# Patient Record
Sex: FEMALE | Race: WHITE | NOT HISPANIC OR LATINO | Employment: FULL TIME | URBAN - METROPOLITAN AREA
[De-identification: names, ages, dates, MRNs, and addresses within clinical notes are randomized per-mention and may not be internally consistent; named-entity substitution may affect disease eponyms.]

---

## 2017-02-20 RX ORDER — LOSARTAN POTASSIUM 100 MG/1
TABLET ORAL
Qty: 30 TABLET | Refills: 0 | Status: SHIPPED | OUTPATIENT
Start: 2017-02-20 | End: 2017-03-17

## 2017-02-20 NOTE — TELEPHONE ENCOUNTER
----- Message from Rita Vivar sent at 2/20/2017  1:09 PM EST -----  Patient needs a refill of her losartin 100mg called in to [facility] in [place], please. She has 2 pills left.

## 2017-03-17 ENCOUNTER — OFFICE VISIT (OUTPATIENT)
Dept: INTERNAL MEDICINE | Facility: CLINIC | Age: 57
End: 2017-03-17

## 2017-03-17 VITALS
HEIGHT: 65 IN | WEIGHT: 163.8 LBS | DIASTOLIC BLOOD PRESSURE: 82 MMHG | TEMPERATURE: 97.3 F | BODY MASS INDEX: 27.29 KG/M2 | SYSTOLIC BLOOD PRESSURE: 132 MMHG

## 2017-03-17 DIAGNOSIS — E53.8 B12 DEFICIENCY: ICD-10-CM

## 2017-03-17 DIAGNOSIS — E55.9 VITAMIN D DEFICIENCY: ICD-10-CM

## 2017-03-17 DIAGNOSIS — Z11.59 NEED FOR HEPATITIS C SCREENING TEST: ICD-10-CM

## 2017-03-17 DIAGNOSIS — I10 BENIGN ESSENTIAL HYPERTENSION: Primary | ICD-10-CM

## 2017-03-17 DIAGNOSIS — J30.1 ALLERGIC RHINITIS DUE TO POLLEN, UNSPECIFIED RHINITIS SEASONALITY: ICD-10-CM

## 2017-03-17 DIAGNOSIS — K21.9 GASTROESOPHAGEAL REFLUX DISEASE, ESOPHAGITIS PRESENCE NOT SPECIFIED: ICD-10-CM

## 2017-03-17 LAB
25(OH)D3 SERPL-MCNC: 24.8 NG/ML
ALBUMIN SERPL-MCNC: 5 G/DL (ref 3.2–4.8)
ALBUMIN/GLOB SERPL: 1.9 G/DL (ref 1.5–2.5)
ALP SERPL-CCNC: 94 U/L (ref 25–100)
ALT SERPL W P-5'-P-CCNC: 17 U/L (ref 7–40)
ANION GAP SERPL CALCULATED.3IONS-SCNC: 3 MMOL/L (ref 3–11)
ARTICHOKE IGE QN: 106 MG/DL (ref 0–130)
AST SERPL-CCNC: 26 U/L (ref 0–33)
BILIRUB SERPL-MCNC: 0.3 MG/DL (ref 0.3–1.2)
BUN BLD-MCNC: 12 MG/DL (ref 9–23)
BUN/CREAT SERPL: 13.3 (ref 7–25)
CALCIUM SPEC-SCNC: 10.7 MG/DL (ref 8.7–10.4)
CHLORIDE SERPL-SCNC: 102 MMOL/L (ref 99–109)
CHOLEST SERPL-MCNC: 238 MG/DL (ref 0–200)
CO2 SERPL-SCNC: 32 MMOL/L (ref 20–31)
CREAT BLD-MCNC: 0.9 MG/DL (ref 0.6–1.3)
GFR SERPL CREATININE-BSD FRML MDRD: 65 ML/MIN/1.73
GLOBULIN UR ELPH-MCNC: 2.7 GM/DL
GLUCOSE BLD-MCNC: 99 MG/DL (ref 70–100)
HCV AB SER DONR QL: NORMAL
HDLC SERPL-MCNC: 111 MG/DL (ref 40–60)
POTASSIUM BLD-SCNC: 5.1 MMOL/L (ref 3.5–5.5)
PROT SERPL-MCNC: 7.7 G/DL (ref 5.7–8.2)
SODIUM BLD-SCNC: 137 MMOL/L (ref 132–146)
TRIGL SERPL-MCNC: 44 MG/DL (ref 0–150)
TSH SERPL DL<=0.05 MIU/L-ACNC: 3.58 MIU/ML (ref 0.35–5.35)
VIT B12 BLD-MCNC: 634 PG/ML (ref 211–911)

## 2017-03-17 PROCEDURE — 99214 OFFICE O/P EST MOD 30 MIN: CPT | Performed by: INTERNAL MEDICINE

## 2017-03-17 PROCEDURE — 80053 COMPREHEN METABOLIC PANEL: CPT | Performed by: INTERNAL MEDICINE

## 2017-03-17 PROCEDURE — 82306 VITAMIN D 25 HYDROXY: CPT | Performed by: INTERNAL MEDICINE

## 2017-03-17 PROCEDURE — 86803 HEPATITIS C AB TEST: CPT | Performed by: INTERNAL MEDICINE

## 2017-03-17 PROCEDURE — 80061 LIPID PANEL: CPT | Performed by: INTERNAL MEDICINE

## 2017-03-17 PROCEDURE — 82607 VITAMIN B-12: CPT | Performed by: INTERNAL MEDICINE

## 2017-03-17 PROCEDURE — 84443 ASSAY THYROID STIM HORMONE: CPT | Performed by: INTERNAL MEDICINE

## 2017-03-17 RX ORDER — LOSARTAN POTASSIUM AND HYDROCHLOROTHIAZIDE 25; 100 MG/1; MG/1
1 TABLET ORAL DAILY
Qty: 90 TABLET | Refills: 1 | Status: SHIPPED | OUTPATIENT
Start: 2017-03-17 | End: 2017-04-19

## 2017-03-17 RX ORDER — OMEPRAZOLE 20 MG/1
20 CAPSULE, DELAYED RELEASE ORAL DAILY
Qty: 90 CAPSULE | Refills: 3 | Status: SHIPPED | OUTPATIENT
Start: 2017-03-17 | End: 2019-01-14

## 2017-03-17 RX ORDER — ESCITALOPRAM OXALATE 10 MG/1
10 TABLET ORAL DAILY
Qty: 90 TABLET | Refills: 3 | Status: CANCELLED | OUTPATIENT
Start: 2017-03-17

## 2017-03-17 RX ORDER — HYDROCHLOROTHIAZIDE 25 MG/1
25 TABLET ORAL DAILY
Qty: 90 TABLET | Refills: 1 | Status: CANCELLED | OUTPATIENT
Start: 2017-03-17

## 2017-03-17 RX ORDER — DESLORATADINE 5 MG/1
5 TABLET ORAL DAILY
Qty: 30 TABLET | Refills: 5 | Status: SHIPPED | OUTPATIENT
Start: 2017-03-17 | End: 2017-11-29

## 2017-03-17 RX ORDER — ESCITALOPRAM OXALATE 20 MG/1
20 TABLET ORAL DAILY
Qty: 90 TABLET | Refills: 3 | Status: SHIPPED | OUTPATIENT
Start: 2017-03-17 | End: 2018-03-19 | Stop reason: SDUPTHER

## 2017-03-17 RX ORDER — LOSARTAN POTASSIUM 100 MG/1
100 TABLET ORAL DAILY
Qty: 30 TABLET | Refills: 0 | Status: CANCELLED | OUTPATIENT
Start: 2017-03-17

## 2017-03-17 NOTE — PROGRESS NOTES
"Subjective   Po Edmondson is a 56 y.o. female.     History of Present Illness     Here for f/u on:   htn - no problems, runs usually even a little lower than today's reading. Would like combo of the hctz and losartan.    Allergies. We had used xyzal in '15 but she felt it was too drying. Likes the stahist and works well, but is expensive, about $30 per rx. Wants to see allergist at some point, but not yet. Also using flonase prn.     Menopausal symptoms - she has had more hot flashes recently, and more irritability. She had initially started the lexapro for anxiety, and has worked. Wondering about going up on dose    b12 def - she is getting shots every 4-6 weeks    D def - she is taking 2000/d, most days    She is wondering if she should start asa. She has had several family members w/ stroke and heart attack.    The following portions of the patient's history were reviewed and updated as appropriate: allergies, current medications, past family history, past medical history, past social history, past surgical history and problem list.    Review of Systems   Constitutional: Positive for fatigue. Negative for activity change, appetite change, fever and unexpected weight change.   Endocrine: Positive for heat intolerance (hot flahses).   Genitourinary: Positive for menstrual problem (menopausal). Negative for vaginal bleeding.   Skin: Negative.  Rash: last year had breakouts w/ her allergies.   Psychiatric/Behavioral: Nervous/anxious: a little more irritability.        Objective   Blood pressure 132/82, temperature 97.3 °F (36.3 °C), temperature source Temporal Artery , height 64.6\" (164.1 cm), weight 163 lb 12.8 oz (74.3 kg).  Physical Exam   Constitutional: She is oriented to person, place, and time. She appears well-developed and well-nourished. No distress.   HENT:   Head: Normocephalic and atraumatic.   Eyes: Conjunctivae and EOM are normal.   Cardiovascular: Normal rate, regular rhythm and normal heart sounds.  " Exam reveals no gallop and no friction rub.    No murmur heard.  Pulmonary/Chest: Effort normal and breath sounds normal. No respiratory distress. She has no wheezes.   Neurological: She is alert and oriented to person, place, and time.   Skin: Skin is warm and dry. She is not diaphoretic.   Psychiatric: She has a normal mood and affect. Her behavior is normal. Judgment and thought content normal.       Assessment/Plan   Po was seen today for med refill.    Diagnoses and all orders for this visit:    Benign essential hypertension  -     Comprehensive Metabolic Panel  -     Lipid Panel  -     TSH  -     losartan-hydrochlorothiazide (HYZAAR) 100-25 MG per tablet; Take 1 tablet by mouth Daily.    Allergic rhinitis due to pollen, unspecified rhinitis seasonality  -     desloratadine (CLARINEX) 5 MG tablet; Take 1 tablet by mouth Daily.    Gastroesophageal reflux disease, esophagitis presence not specified    Need for hepatitis C screening test  -     Hepatitis C Antibody    B12 deficiency  -     Vitamin B12    Vitamin D deficiency  -     Vitamin D 25 Hydroxy    Other orders  -     omeprazole (priLOSEC) 20 MG capsule; Take 1 capsule by mouth Daily.  -     escitalopram (LEXAPRO) 20 MG tablet; Take 1 tablet by mouth Daily.        She had her abner in 3/17. Colon is due in '20. We will try higher dose of lexapro for irritability and menopaual symptoms. Will see how clarinex does compared w/ the stahist

## 2017-04-18 ENCOUNTER — TELEPHONE (OUTPATIENT)
Dept: INTERNAL MEDICINE | Facility: CLINIC | Age: 57
End: 2017-04-18

## 2017-04-18 NOTE — TELEPHONE ENCOUNTER
She is now taking the combined BP with the hctz.  She has noticed she is having more swelling in her hands and feet.  She said you can see the tightness.  Wanted to let you know.

## 2017-04-19 RX ORDER — HYDROCHLOROTHIAZIDE 25 MG/1
25 TABLET ORAL DAILY
Qty: 90 TABLET | Refills: 1 | Status: SHIPPED | OUTPATIENT
Start: 2017-04-19 | End: 2017-10-30 | Stop reason: SDUPTHER

## 2017-04-19 RX ORDER — LOSARTAN POTASSIUM 100 MG/1
100 TABLET ORAL DAILY
Qty: 90 TABLET | Refills: 1 | Status: SHIPPED | OUTPATIENT
Start: 2017-04-19 | End: 2017-09-11 | Stop reason: SDUPTHER

## 2017-09-11 RX ORDER — LOSARTAN POTASSIUM 100 MG/1
TABLET ORAL
Qty: 30 TABLET | Refills: 0 | Status: SHIPPED | OUTPATIENT
Start: 2017-09-11 | End: 2017-11-29 | Stop reason: SDUPTHER

## 2017-09-22 RX ORDER — CYANOCOBALAMIN 1000 UG/ML
INJECTION, SOLUTION INTRAMUSCULAR; SUBCUTANEOUS
Qty: 1 ML | Refills: 5 | Status: SHIPPED | OUTPATIENT
Start: 2017-09-22 | End: 2018-07-18 | Stop reason: SDUPTHER

## 2017-10-30 ENCOUNTER — TELEPHONE (OUTPATIENT)
Dept: INTERNAL MEDICINE | Facility: CLINIC | Age: 57
End: 2017-10-30

## 2017-10-30 RX ORDER — FLUTICASONE PROPIONATE 50 MCG
2 SPRAY, SUSPENSION (ML) NASAL DAILY
Qty: 1 BOTTLE | Refills: 0 | Status: SHIPPED | OUTPATIENT
Start: 2017-10-30 | End: 2018-07-18

## 2017-10-30 RX ORDER — HYDROCHLOROTHIAZIDE 25 MG/1
25 TABLET ORAL DAILY
Qty: 30 TABLET | Refills: 0 | Status: SHIPPED | OUTPATIENT
Start: 2017-10-30 | End: 2017-11-29 | Stop reason: SDUPTHER

## 2017-11-29 ENCOUNTER — OFFICE VISIT (OUTPATIENT)
Dept: INTERNAL MEDICINE | Facility: CLINIC | Age: 57
End: 2017-11-29

## 2017-11-29 VITALS
TEMPERATURE: 97.3 F | WEIGHT: 159.6 LBS | BODY MASS INDEX: 26.59 KG/M2 | HEIGHT: 65 IN | DIASTOLIC BLOOD PRESSURE: 80 MMHG | SYSTOLIC BLOOD PRESSURE: 130 MMHG

## 2017-11-29 DIAGNOSIS — J30.2 CHRONIC SEASONAL ALLERGIC RHINITIS, UNSPECIFIED TRIGGER: ICD-10-CM

## 2017-11-29 DIAGNOSIS — I10 ESSENTIAL HYPERTENSION: Primary | ICD-10-CM

## 2017-11-29 LAB
ALBUMIN SERPL-MCNC: 4.7 G/DL (ref 3.2–4.8)
ALBUMIN/GLOB SERPL: 2 G/DL (ref 1.5–2.5)
ALP SERPL-CCNC: 80 U/L (ref 25–100)
ALT SERPL W P-5'-P-CCNC: 18 U/L (ref 7–40)
ANION GAP SERPL CALCULATED.3IONS-SCNC: 9 MMOL/L (ref 3–11)
AST SERPL-CCNC: 20 U/L (ref 0–33)
BASOPHILS # BLD AUTO: 0.06 10*3/MM3 (ref 0–0.2)
BASOPHILS NFR BLD AUTO: 0.7 % (ref 0–1)
BILIRUB SERPL-MCNC: 0.8 MG/DL (ref 0.3–1.2)
BUN BLD-MCNC: 9 MG/DL (ref 9–23)
BUN/CREAT SERPL: 10 (ref 7–25)
CALCIUM SPEC-SCNC: 9.7 MG/DL (ref 8.7–10.4)
CHLORIDE SERPL-SCNC: 90 MMOL/L (ref 99–109)
CO2 SERPL-SCNC: 29 MMOL/L (ref 20–31)
CREAT BLD-MCNC: 0.9 MG/DL (ref 0.6–1.3)
DEPRECATED RDW RBC AUTO: 39.9 FL (ref 37–54)
EOSINOPHIL # BLD AUTO: 0.06 10*3/MM3 (ref 0–0.3)
EOSINOPHIL NFR BLD AUTO: 0.7 % (ref 0–3)
ERYTHROCYTE [DISTWIDTH] IN BLOOD BY AUTOMATED COUNT: 12.4 % (ref 11.3–14.5)
GFR SERPL CREATININE-BSD FRML MDRD: 65 ML/MIN/1.73
GLOBULIN UR ELPH-MCNC: 2.3 GM/DL
GLUCOSE BLD-MCNC: 99 MG/DL (ref 70–100)
HCT VFR BLD AUTO: 36.8 % (ref 34.5–44)
HGB BLD-MCNC: 11.6 G/DL (ref 11.5–15.5)
IMM GRANULOCYTES # BLD: 0.02 10*3/MM3 (ref 0–0.03)
IMM GRANULOCYTES NFR BLD: 0.2 % (ref 0–0.6)
LYMPHOCYTES # BLD AUTO: 1.48 10*3/MM3 (ref 0.6–4.8)
LYMPHOCYTES NFR BLD AUTO: 17.5 % (ref 24–44)
MCH RBC QN AUTO: 28 PG (ref 27–31)
MCHC RBC AUTO-ENTMCNC: 31.5 G/DL (ref 32–36)
MCV RBC AUTO: 88.9 FL (ref 80–99)
MONOCYTES # BLD AUTO: 0.69 10*3/MM3 (ref 0–1)
MONOCYTES NFR BLD AUTO: 8.2 % (ref 0–12)
NEUTROPHILS # BLD AUTO: 6.14 10*3/MM3 (ref 1.5–8.3)
NEUTROPHILS NFR BLD AUTO: 72.7 % (ref 41–71)
PLATELET # BLD AUTO: 339 10*3/MM3 (ref 150–450)
PMV BLD AUTO: 10.7 FL (ref 6–12)
POTASSIUM BLD-SCNC: 5 MMOL/L (ref 3.5–5.5)
PROT SERPL-MCNC: 7 G/DL (ref 5.7–8.2)
RBC # BLD AUTO: 4.14 10*6/MM3 (ref 3.89–5.14)
SODIUM BLD-SCNC: 128 MMOL/L (ref 132–146)
TSH SERPL DL<=0.05 MIU/L-ACNC: 2.15 MIU/ML (ref 0.35–5.35)
WBC NRBC COR # BLD: 8.45 10*3/MM3 (ref 3.5–10.8)

## 2017-11-29 PROCEDURE — 99214 OFFICE O/P EST MOD 30 MIN: CPT | Performed by: INTERNAL MEDICINE

## 2017-11-29 PROCEDURE — 84443 ASSAY THYROID STIM HORMONE: CPT | Performed by: INTERNAL MEDICINE

## 2017-11-29 PROCEDURE — 80053 COMPREHEN METABOLIC PANEL: CPT | Performed by: INTERNAL MEDICINE

## 2017-11-29 PROCEDURE — 85025 COMPLETE CBC W/AUTO DIFF WBC: CPT | Performed by: INTERNAL MEDICINE

## 2017-11-29 RX ORDER — BUPROPION HYDROCHLORIDE 150 MG/1
150 TABLET ORAL DAILY
Qty: 90 TABLET | Refills: 3 | Status: SHIPPED | OUTPATIENT
Start: 2017-11-29 | End: 2018-07-18

## 2017-11-29 RX ORDER — LOSARTAN POTASSIUM 100 MG/1
100 TABLET ORAL DAILY
Qty: 90 TABLET | Refills: 1 | Status: SHIPPED | OUTPATIENT
Start: 2017-11-29 | End: 2018-03-08

## 2017-11-29 RX ORDER — BUPROPION HYDROCHLORIDE 150 MG/1
150 TABLET ORAL DAILY
COMMUNITY
End: 2017-11-29 | Stop reason: SDUPTHER

## 2017-11-29 RX ORDER — HYDROCHLOROTHIAZIDE 25 MG/1
25 TABLET ORAL DAILY
Qty: 90 TABLET | Refills: 1 | Status: SHIPPED | OUTPATIENT
Start: 2017-11-29 | End: 2018-06-27 | Stop reason: SDUPTHER

## 2017-11-29 RX ORDER — FEXOFENADINE HCL AND PSEUDOEPHEDRINE HCI 180; 240 MG/1; MG/1
1 TABLET, EXTENDED RELEASE ORAL DAILY
Qty: 90 TABLET | Refills: 3 | Status: SHIPPED | OUTPATIENT
Start: 2017-11-29 | End: 2018-07-18

## 2017-11-29 NOTE — PROGRESS NOTES
Subjective   Po Edmondson is a 57 y.o. female.     History of Present Illness   Here for f/u on:     htn - no problems, checks regularly and  runs in the 120s/60s range     Allergies. We had used xyzal in '15 but she felt it was too drying. Likes the stahist and works well, but is expensive, about $30 per rx. We tried clarinex last visit, but this is not helping, so stopped. Using flonase but doesn't help much. She has a lot of drainage/congestion, postnasal drip, sore throat. Has been worse this fall.   Wants to see allergist at some point, but not yet.  She had been tested years ago, but had been on allergy meds at the time so not sure if they were accurate     Menopausal symptoms - she is on Evamist and progestogen now w/ Dr Cruz and that has helped w/ her hot flashes. She is taking the lexapro 10 (1/2 of a 20), and wellbutrin 150 and doing ok w/ these as well    b12 def - she is getting shots every 4-6 weeks     D def - she is taking 2000/d, but not regualrly       The following portions of the patient's history were reviewed and updated as appropriate: allergies, current medications, past family history, past medical history, past social history, past surgical history and problem list.    Review of Systems   Constitutional: Positive for unexpected weight change (trying to lose). Negative for activity change and appetite change.   HENT: Positive for congestion, ear pain, postnasal drip, rhinorrhea, sinus pressure and sore throat.    Cardiovascular: Positive for leg swelling (uses compression stockings which help).   Gastrointestinal: Positive for constipation (she uses sennekot prn and helps).   Genitourinary:        On HRT now, working well   Musculoskeletal: Negative for arthralgias.   Allergic/Immunologic: Positive for environmental allergies. Negative for food allergies and immunocompromised state.   Psychiatric/Behavioral: Negative for dysphoric mood.       Objective   Blood pressure 130/80, temperature  "97.3 °F (36.3 °C), temperature source Temporal Artery , height 64.6\" (164.1 cm), weight 159 lb 9.6 oz (72.4 kg).  Physical Exam   Constitutional: She is oriented to person, place, and time. She appears well-developed and well-nourished. No distress.   HENT:   Head: Normocephalic and atraumatic.   Right Ear: External ear normal.   Left Ear: External ear normal.   Mouth/Throat: No oropharyngeal exudate.   Eyes: Conjunctivae and EOM are normal.   Cardiovascular: Normal rate, regular rhythm and normal heart sounds.  Exam reveals no gallop and no friction rub.    No murmur heard.  Pulmonary/Chest: Effort normal and breath sounds normal. No respiratory distress. She has no wheezes.   Neurological: She is alert and oriented to person, place, and time.   Skin: Skin is warm and dry. She is not diaphoretic.   Psychiatric: She has a normal mood and affect. Her behavior is normal. Judgment and thought content normal.       Assessment/Plan   Po was seen today for med refill and allergies.    Diagnoses and all orders for this visit:    Essential hypertension - good control  -     Comprehensive Metabolic Panel  -     CBC & Differential  -     TSH  -     CBC Auto Differential  -     losartan (COZAAR) 100 MG tablet; Take 1 tablet by mouth Daily.  -     hydrochlorothiazide (HYDRODIURIL) 25 MG tablet; Take 1 tablet by mouth Daily.    Chronic seasonal allergic rhinitis, unspecified trigger - will try allegra D and d/c clarinex. We disucssed singulair, and we may add this if allegraD is not enough. She will monitor BP on the allegraD  -     fexofenadine-pseudoephedrine (ALLEGRA-D ALLERGY & CONGESTION) 180-240 MG per 24 hr tablet; Take 1 tablet by mouth Daily.    Other orders  -     buPROPion XL (WELLBUTRIN XL) 150 MG 24 hr tablet; Take 1 tablet by mouth Daily.      She will try to take the D more regualrly         "

## 2017-12-02 DIAGNOSIS — E87.1 HYPONATREMIA: Primary | ICD-10-CM

## 2018-03-08 RX ORDER — LOSARTAN POTASSIUM 100 MG/1
TABLET ORAL
Qty: 90 TABLET | Refills: 0 | Status: SHIPPED | OUTPATIENT
Start: 2018-03-08 | End: 2018-06-27 | Stop reason: SDUPTHER

## 2018-03-19 RX ORDER — ESCITALOPRAM OXALATE 20 MG/1
TABLET ORAL
Qty: 90 TABLET | Refills: 2 | Status: SHIPPED | OUTPATIENT
Start: 2018-03-19 | End: 2019-03-19

## 2018-06-27 DIAGNOSIS — I10 ESSENTIAL HYPERTENSION: ICD-10-CM

## 2018-06-27 RX ORDER — HYDROCHLOROTHIAZIDE 25 MG/1
25 TABLET ORAL DAILY
Qty: 30 TABLET | Refills: 0 | Status: SHIPPED | OUTPATIENT
Start: 2018-06-27 | End: 2018-07-18 | Stop reason: SDUPTHER

## 2018-06-27 RX ORDER — LOSARTAN POTASSIUM 100 MG/1
100 TABLET ORAL DAILY
Qty: 30 TABLET | Refills: 0 | Status: SHIPPED | OUTPATIENT
Start: 2018-06-27 | End: 2018-07-18 | Stop reason: SDUPTHER

## 2018-07-17 PROBLEM — E55.9 VITAMIN D DEFICIENCY: Status: ACTIVE | Noted: 2018-07-17

## 2018-07-18 ENCOUNTER — OFFICE VISIT (OUTPATIENT)
Dept: INTERNAL MEDICINE | Facility: CLINIC | Age: 58
End: 2018-07-18

## 2018-07-18 VITALS
DIASTOLIC BLOOD PRESSURE: 82 MMHG | TEMPERATURE: 98.1 F | WEIGHT: 159.2 LBS | BODY MASS INDEX: 26.52 KG/M2 | HEART RATE: 75 BPM | SYSTOLIC BLOOD PRESSURE: 128 MMHG | OXYGEN SATURATION: 99 % | HEIGHT: 65 IN

## 2018-07-18 DIAGNOSIS — I10 ESSENTIAL HYPERTENSION: ICD-10-CM

## 2018-07-18 DIAGNOSIS — I10 BENIGN ESSENTIAL HYPERTENSION: ICD-10-CM

## 2018-07-18 DIAGNOSIS — J30.89 CHRONIC NON-SEASONAL ALLERGIC RHINITIS, UNSPECIFIED TRIGGER: ICD-10-CM

## 2018-07-18 DIAGNOSIS — E55.9 VITAMIN D DEFICIENCY: ICD-10-CM

## 2018-07-18 DIAGNOSIS — D51.0 PERNICIOUS ANEMIA: Primary | ICD-10-CM

## 2018-07-18 LAB
25(OH)D3 SERPL-MCNC: 27 NG/ML
ALBUMIN SERPL-MCNC: 4.47 G/DL (ref 3.2–4.8)
ALBUMIN/GLOB SERPL: 2.1 G/DL (ref 1.5–2.5)
ALP SERPL-CCNC: 65 U/L (ref 25–100)
ALT SERPL W P-5'-P-CCNC: 17 U/L (ref 7–40)
ANION GAP SERPL CALCULATED.3IONS-SCNC: 10 MMOL/L (ref 3–11)
ARTICHOKE IGE QN: 97 MG/DL (ref 0–130)
AST SERPL-CCNC: 25 U/L (ref 0–33)
BILIRUB SERPL-MCNC: 0.8 MG/DL (ref 0.3–1.2)
BUN BLD-MCNC: 10 MG/DL (ref 9–23)
BUN/CREAT SERPL: 11.8 (ref 7–25)
CALCIUM SPEC-SCNC: 9 MG/DL (ref 8.7–10.4)
CHLORIDE SERPL-SCNC: 95 MMOL/L (ref 99–109)
CHOLEST SERPL-MCNC: 192 MG/DL (ref 0–200)
CO2 SERPL-SCNC: 27 MMOL/L (ref 20–31)
CREAT BLD-MCNC: 0.85 MG/DL (ref 0.6–1.3)
DEPRECATED RDW RBC AUTO: 41.1 FL (ref 37–54)
ERYTHROCYTE [DISTWIDTH] IN BLOOD BY AUTOMATED COUNT: 12.4 % (ref 11.3–14.5)
GFR SERPL CREATININE-BSD FRML MDRD: 69 ML/MIN/1.73
GLOBULIN UR ELPH-MCNC: 2.1 GM/DL
GLUCOSE BLD-MCNC: 88 MG/DL (ref 70–100)
HCT VFR BLD AUTO: 34.8 % (ref 34.5–44)
HDLC SERPL-MCNC: 85 MG/DL (ref 40–60)
HGB BLD-MCNC: 11.1 G/DL (ref 11.5–15.5)
MCH RBC QN AUTO: 28.9 PG (ref 27–31)
MCHC RBC AUTO-ENTMCNC: 31.9 G/DL (ref 32–36)
MCV RBC AUTO: 90.6 FL (ref 80–99)
PLATELET # BLD AUTO: 311 10*3/MM3 (ref 150–450)
PMV BLD AUTO: 10.9 FL (ref 6–12)
POTASSIUM BLD-SCNC: 4.3 MMOL/L (ref 3.5–5.5)
PROT SERPL-MCNC: 6.6 G/DL (ref 5.7–8.2)
RBC # BLD AUTO: 3.84 10*6/MM3 (ref 3.89–5.14)
SODIUM BLD-SCNC: 132 MMOL/L (ref 132–146)
TRIGL SERPL-MCNC: 55 MG/DL (ref 0–150)
VIT B12 BLD-MCNC: 377 PG/ML (ref 211–911)
WBC NRBC COR # BLD: 5.54 10*3/MM3 (ref 3.5–10.8)

## 2018-07-18 PROCEDURE — 99214 OFFICE O/P EST MOD 30 MIN: CPT | Performed by: INTERNAL MEDICINE

## 2018-07-18 PROCEDURE — 80053 COMPREHEN METABOLIC PANEL: CPT | Performed by: INTERNAL MEDICINE

## 2018-07-18 PROCEDURE — 80061 LIPID PANEL: CPT | Performed by: INTERNAL MEDICINE

## 2018-07-18 PROCEDURE — 82607 VITAMIN B-12: CPT | Performed by: INTERNAL MEDICINE

## 2018-07-18 PROCEDURE — 82306 VITAMIN D 25 HYDROXY: CPT | Performed by: INTERNAL MEDICINE

## 2018-07-18 PROCEDURE — 85027 COMPLETE CBC AUTOMATED: CPT | Performed by: INTERNAL MEDICINE

## 2018-07-18 RX ORDER — HYDROCHLOROTHIAZIDE 25 MG/1
25 TABLET ORAL DAILY
Qty: 90 TABLET | Refills: 1 | Status: SHIPPED | OUTPATIENT
Start: 2018-07-18 | End: 2019-02-07 | Stop reason: SDUPTHER

## 2018-07-18 RX ORDER — CYANOCOBALAMIN 1000 UG/ML
1000 INJECTION, SOLUTION INTRAMUSCULAR; SUBCUTANEOUS
Qty: 1 ML | Refills: 5 | Status: SHIPPED | OUTPATIENT
Start: 2018-07-18 | End: 2019-09-24 | Stop reason: SDUPTHER

## 2018-07-18 RX ORDER — LOSARTAN POTASSIUM 100 MG/1
100 TABLET ORAL DAILY
Qty: 90 TABLET | Refills: 1 | Status: SHIPPED | OUTPATIENT
Start: 2018-07-18 | End: 2019-05-20 | Stop reason: SDUPTHER

## 2018-07-18 NOTE — PROGRESS NOTES
"Subjective   Po Edmondson is a 57 y.o. female.     History of Present Illness   Here for f/u on:     htn - no problems, checks regularly and  runs in the 120s/60s range. Taking cozaar and HCTZ regualrly     Allergies. We had used xyzal in '15 but she felt it was too drying. Likes the stahist and works well, but is expensive, about $30 per rx. We tried clarinex and allegra D - but these did not work as well. last visit, but this is not helping, so stopped. Using flonase but doesn't help much. She has a lot of drainage/congestion, postnasal drip, sore throat. Has been worse this fall.   Wants to see allergist at some point, but not yet.  She had been tested years ago, but had been on allergy meds at the time so not sure if they were accurate.    Menopausal symptoms - she is on Evamist and progestogen now w/ Dr Cruz and that has helped w/ her hot flashes. She is taking the lexapro 10 (1/2 of a 20)     b12 def - she is getting shots every 4-6 weeks. Had one about a month ago     D def - she is taking 2000/d, but not regualrly, maybe just     The following portions of the patient's history were reviewed and updated as appropriate: allergies, current medications, past family history, past medical history, past social history, past surgical history and problem list.    Review of Systems   Cardiovascular: Positive for leg swelling (uses compression stockings).   Allergic/Immunologic: Positive for environmental allergies. Negative for immunocompromised state.   Psychiatric/Behavioral: The patient is not nervous/anxious.        Objective    Vitals:    07/18/18 1324   BP: 128/82   BP Location: Right arm   Pulse: 75   Temp: 98.1 °F (36.7 °C)   TempSrc: Temporal Artery    SpO2: 99%   Weight: 72.2 kg (159 lb 3.2 oz)   Height: 164.1 cm (64.6\")     Physical Exam   Constitutional: She is oriented to person, place, and time. She appears well-developed and well-nourished. No distress.   HENT:   Head: Normocephalic and atraumatic. "   Eyes: Pupils are equal, round, and reactive to light. EOM are normal.   Neck: Normal range of motion. Neck supple.   Cardiovascular: Normal rate and regular rhythm.    Pulmonary/Chest: Effort normal and breath sounds normal.   Musculoskeletal: She exhibits no edema.   Neurological: She is alert and oriented to person, place, and time. No cranial nerve deficit.   Skin: Skin is warm and dry. No rash noted. She is not diaphoretic.   Psychiatric: She has a normal mood and affect. Her behavior is normal. Judgment and thought content normal.         Assessment/Plan   Po was seen today for med refill.    Diagnoses and all orders for this visit:    Pernicious anemia  -     Vitamin B12  -     cyanocobalamin 1000 MCG/ML injection; Inject 1 mL into the appropriate muscle as directed by prescriber Every 28 (Twenty-Eight) Days.    Benign essential hypertension - good control  -     CBC (No Diff)  -     Comprehensive Metabolic Panel  -     losartan (COZAAR) 100 MG tablet; Take 1 tablet by mouth Daily.  -     hydrochlorothiazide (HYDRODIURIL) 25 MG tablet; Take 1 tablet by mouth Daily.    Chronic non-seasonal allergic rhinitis, unspecified trigger - stahist has worked the best for her  -     Chlorcyclizine-Pseudoephed (STAHIST AD) 25-60 MG tablet; 1 qd- bid prn allergies      Vitamin D deficiency  -     Vitamin D 25 Hydroxy

## 2018-09-12 RX ORDER — FLUTICASONE PROPIONATE 50 MCG
2 SPRAY, SUSPENSION (ML) NASAL DAILY
Qty: 3 BOTTLE | Refills: 3 | Status: SHIPPED | OUTPATIENT
Start: 2018-09-12 | End: 2020-10-15

## 2019-01-14 ENCOUNTER — OFFICE VISIT (OUTPATIENT)
Dept: INTERNAL MEDICINE | Facility: CLINIC | Age: 59
End: 2019-01-14

## 2019-01-14 VITALS
RESPIRATION RATE: 16 BRPM | WEIGHT: 168.6 LBS | HEART RATE: 85 BPM | TEMPERATURE: 98.1 F | SYSTOLIC BLOOD PRESSURE: 126 MMHG | HEIGHT: 65 IN | OXYGEN SATURATION: 99 % | DIASTOLIC BLOOD PRESSURE: 78 MMHG | BODY MASS INDEX: 28.09 KG/M2

## 2019-01-14 DIAGNOSIS — J01.11 ACUTE RECURRENT FRONTAL SINUSITIS: Primary | ICD-10-CM

## 2019-01-14 DIAGNOSIS — R05.9 COUGH: ICD-10-CM

## 2019-01-14 PROCEDURE — 99213 OFFICE O/P EST LOW 20 MIN: CPT | Performed by: NURSE PRACTITIONER

## 2019-01-14 RX ORDER — BROMPHENIRAMINE MALEATE, PSEUDOEPHEDRINE HYDROCHLORIDE, AND DEXTROMETHORPHAN HYDROBROMIDE 2; 30; 10 MG/5ML; MG/5ML; MG/5ML
SYRUP ORAL
COMMUNITY
Start: 2018-12-13 | End: 2019-01-14

## 2019-01-14 RX ORDER — ESTRADIOL 1 MG/G
GEL TOPICAL
COMMUNITY
End: 2020-02-06 | Stop reason: ALTCHOICE

## 2019-01-14 RX ORDER — DEXTROMETHORPHAN HYDROBROMIDE AND PROMETHAZINE HYDROCHLORIDE 15; 6.25 MG/5ML; MG/5ML
5 SYRUP ORAL 4 TIMES DAILY PRN
Qty: 240 ML | Refills: 0 | Status: SHIPPED | OUTPATIENT
Start: 2019-01-14 | End: 2019-02-25

## 2019-01-14 RX ORDER — LEVOFLOXACIN 500 MG/1
500 TABLET, FILM COATED ORAL DAILY
Qty: 5 TABLET | Refills: 0 | Status: SHIPPED | OUTPATIENT
Start: 2019-01-14 | End: 2019-02-25

## 2019-01-14 NOTE — PROGRESS NOTES
Christine Edmondson is a 58 y.o. female.     URI    This is a new problem. The current episode started more than 1 month ago. The problem has been waxing and waning. There has been no fever. Associated symptoms include congestion, coughing, ear pain (intermittent), headaches, rhinorrhea, sinus pain and a sore throat. Pertinent negatives include no abdominal pain, chest pain, diarrhea, dysuria, joint pain, joint swelling, nausea, neck pain, plugged ear sensation, rash, sneezing, swollen glands, vomiting or wheezing. She has tried NSAIDs and steam (antibiotics) for the symptoms. The treatment provided mild relief.      Has been to [facility] twice since beginning of December 2018 for sinusitis  Has had amoxicillin and sterod. Went back and meds changed to doxycycline, steroid, flonase, and miucinex.   Briefly had some improvement but did not fully resolve.   Symptoms ongoing mildly but got significantly worse several days ago.   Having burning pain in chest with a deep breath and with coughing   Cough is nonproductive. No SOA or wheezing    The following portions of the patient's history were reviewed and updated as appropriate: allergies, current medications, past family history, past medical history, past social history, past surgical history and problem list.    Review of Systems   Constitutional: Negative for appetite change, chills, diaphoresis, fatigue and fever.   HENT: Positive for congestion, ear pain (intermittent), postnasal drip, rhinorrhea, sinus pressure, sinus pain, sore throat and voice change. Negative for sneezing.    Eyes: Negative for pain.   Respiratory: Positive for cough. Negative for shortness of breath and wheezing.    Cardiovascular: Negative for chest pain.   Gastrointestinal: Negative for abdominal pain, diarrhea, nausea and vomiting.   Genitourinary: Negative for dysuria.   Musculoskeletal: Negative for joint pain and neck pain.   Skin: Negative for rash.   Neurological: Positive  for headaches. Negative for dizziness.   Hematological: Negative for adenopathy.       Objective   Physical Exam   Constitutional: She appears well-developed and well-nourished. No distress.   HENT:   Head: Normocephalic and atraumatic.   Right Ear: Tympanic membrane and external ear normal.   Left Ear: Tympanic membrane and external ear normal.   Nose: Mucosal edema and rhinorrhea present. Right sinus exhibits frontal sinus tenderness. Right sinus exhibits no maxillary sinus tenderness. Left sinus exhibits frontal sinus tenderness. Left sinus exhibits no maxillary sinus tenderness.   Mouth/Throat: Uvula is midline, oropharynx is clear and moist and mucous membranes are normal. No oropharyngeal exudate.   Eyes: Conjunctivae are normal. Right eye exhibits no discharge. Left eye exhibits no discharge.   Neck: Normal range of motion. Neck supple.   Cardiovascular: Normal rate, regular rhythm and normal heart sounds.   Pulmonary/Chest: Effort normal and breath sounds normal. No respiratory distress.   Abdominal: Soft. She exhibits no distension.   Lymphadenopathy:     She has no cervical adenopathy.   Skin: Skin is warm and dry. Capillary refill takes less than 2 seconds. She is not diaphoretic.   Psychiatric: She has a normal mood and affect. Her behavior is normal.   Nursing note and vitals reviewed.      Assessment/Plan      Po was seen today for uri.    Diagnoses and all orders for this visit:    Acute recurrent frontal sinusitis  -     levoFLOXacin (LEVAQUIN) 500 MG tablet; Take 1 tablet by mouth Daily.  -     promethazine-dextromethorphan (PROMETHAZINE-DM) 6.25-15 MG/5ML syrup; Take 5 mL by mouth 4 (Four) Times a Day As Needed for Cough.    Cough  -     promethazine-dextromethorphan (PROMETHAZINE-DM) 6.25-15 MG/5ML syrup; Take 5 mL by mouth 4 (Four) Times a Day As Needed for Cough.      meds as above.   Increase rest and fluids  Tylenol or ibuprofen OTC PRN per package instructions  Warm salt water gargles,  lozenges, humidifier.   Return if symptoms worsen or fail to improve.  Plan of care discussed with pt. They verbalized understanding and agreement.

## 2019-02-07 DIAGNOSIS — I10 BENIGN ESSENTIAL HYPERTENSION: ICD-10-CM

## 2019-02-08 RX ORDER — HYDROCHLOROTHIAZIDE 25 MG/1
25 TABLET ORAL DAILY
Qty: 30 TABLET | Refills: 0 | Status: SHIPPED | OUTPATIENT
Start: 2019-02-08 | End: 2019-03-16 | Stop reason: SDUPTHER

## 2019-02-24 NOTE — PROGRESS NOTES
"Christien Edmondson is a 58 y.o. female.     History of Present Illness     Here for f/u on:    htn - she has checked a few times and has been similar to todays. hsa gained some weight. Not as active w/ exercise this winter    Allergies - she did see Dr Rivera and was allergy tested which was essentially negative. He suggested trying Dymista for nonallergic rhinitis. She did have sinusitis and URI so was on several rounds of antibiotics. She took levaquin and finally got better. She still feels some chest wall pain over the sternum, sometimes pushing or if she takes a deep breath    b12 def - she is getting shots every 6 weeks. Had one about 2 weeks ago     D def - she is off D. D was 27 last year    Depression - she has gone back up to 20 lexapro    The following portions of the patient's history were reviewed and updated as appropriate: allergies, current medications, past family history, past medical history, past social history, past surgical history and problem list.    Review of Systems   Constitutional: Positive for unexpected weight gain. Negative for fever and unexpected weight loss.   Respiratory: Negative for shortness of breath.    Cardiovascular: Positive for chest pain (chest wall pain).   Skin: Negative.    Allergic/Immunologic: Positive for environmental allergies. Negative for immunocompromised state.   Neurological: Negative for seizures, memory problem and confusion.   Psychiatric/Behavioral: Positive for depressed mood (a  Little more recently -  has increased Lexapro). Negative for agitation.       Objective   /82 (BP Location: Right arm)   Temp 98 °F (36.7 °C) (Temporal)   Ht 164.1 cm (64.6\")   Wt 76.8 kg (169 lb 6.4 oz)   BMI 28.54 kg/m²   Physical Exam   Constitutional: She is oriented to person, place, and time. She appears well-developed and well-nourished. No distress.   HENT:   Head: Normocephalic and atraumatic.   Eyes: Conjunctivae and EOM are normal. Pupils are equal, " round, and reactive to light. Right eye exhibits no discharge. Left eye exhibits no discharge.   Neck: Normal range of motion. Neck supple.   Cardiovascular: Normal rate and regular rhythm.   Pulmonary/Chest: Effort normal and breath sounds normal. She exhibits tenderness (over sternum and costochondral joints in lower sternum).   Abdominal: Soft. Bowel sounds are normal. She exhibits no distension. There is no tenderness.   Musculoskeletal: She exhibits no edema.   Neurological: She is alert and oriented to person, place, and time. No cranial nerve deficit.   Skin: Skin is warm and dry. No rash noted. She is not diaphoretic.   Psychiatric: She has a normal mood and affect. Her behavior is normal. Judgment and thought content normal.   Nursing note and vitals reviewed.        Assessment/Plan   Po was seen today for med refill and hypertension.    Diagnoses and all orders for this visit:    Benign essential hypertension -good control on current regimen  -     CBC (No Diff)  -     Comprehensive Metabolic Panel  -     TSH    Nonallergic rhinitis     Vitamin D deficiency -off D, will recheck  -     Vitamin D 25 Hydroxy    Pernicious anemia -B12 shots.  We will recheck today  -     Vitamin B12    Need for hepatitis A immunization  -     Hepatitis A Vaccine Adult IM    Chest wall pain  Comments:  Probably costochondritis or other musculoskeletal pain.  She will call me if it worsens.  Okay to take NSAID as needed

## 2019-02-25 ENCOUNTER — OFFICE VISIT (OUTPATIENT)
Dept: INTERNAL MEDICINE | Facility: CLINIC | Age: 59
End: 2019-02-25

## 2019-02-25 VITALS
WEIGHT: 169.4 LBS | TEMPERATURE: 98 F | DIASTOLIC BLOOD PRESSURE: 82 MMHG | HEIGHT: 65 IN | SYSTOLIC BLOOD PRESSURE: 124 MMHG | BODY MASS INDEX: 28.22 KG/M2

## 2019-02-25 DIAGNOSIS — E55.9 VITAMIN D DEFICIENCY: ICD-10-CM

## 2019-02-25 DIAGNOSIS — R07.89 CHEST WALL PAIN: ICD-10-CM

## 2019-02-25 DIAGNOSIS — Z23 NEED FOR HEPATITIS A IMMUNIZATION: ICD-10-CM

## 2019-02-25 DIAGNOSIS — I10 BENIGN ESSENTIAL HYPERTENSION: Primary | ICD-10-CM

## 2019-02-25 DIAGNOSIS — D51.0 PERNICIOUS ANEMIA: ICD-10-CM

## 2019-02-25 DIAGNOSIS — J31.0 NONALLERGIC RHINITIS: ICD-10-CM

## 2019-02-25 LAB
25(OH)D3 SERPL-MCNC: 19.4 NG/ML
ALBUMIN SERPL-MCNC: 4.88 G/DL (ref 3.2–4.8)
ALBUMIN/GLOB SERPL: 2.3 G/DL (ref 1.5–2.5)
ALP SERPL-CCNC: 83 U/L (ref 25–100)
ALT SERPL W P-5'-P-CCNC: 20 U/L (ref 7–40)
ANION GAP SERPL CALCULATED.3IONS-SCNC: 8 MMOL/L (ref 3–11)
AST SERPL-CCNC: 20 U/L (ref 0–33)
BILIRUB SERPL-MCNC: 0.3 MG/DL (ref 0.3–1.2)
BUN BLD-MCNC: 18 MG/DL (ref 9–23)
BUN/CREAT SERPL: 18.8 (ref 7–25)
CALCIUM SPEC-SCNC: 9.6 MG/DL (ref 8.7–10.4)
CHLORIDE SERPL-SCNC: 95 MMOL/L (ref 99–109)
CO2 SERPL-SCNC: 27 MMOL/L (ref 20–31)
CREAT BLD-MCNC: 0.96 MG/DL (ref 0.6–1.3)
DEPRECATED RDW RBC AUTO: 42.9 FL (ref 37–54)
ERYTHROCYTE [DISTWIDTH] IN BLOOD BY AUTOMATED COUNT: 13.1 % (ref 11.3–14.5)
GFR SERPL CREATININE-BSD FRML MDRD: 60 ML/MIN/1.73
GLOBULIN UR ELPH-MCNC: 2.1 GM/DL
GLUCOSE BLD-MCNC: 96 MG/DL (ref 70–100)
HCT VFR BLD AUTO: 37.3 % (ref 34.5–44)
HGB BLD-MCNC: 11.9 G/DL (ref 11.5–15.5)
MCH RBC QN AUTO: 28.7 PG (ref 27–31)
MCHC RBC AUTO-ENTMCNC: 31.9 G/DL (ref 32–36)
MCV RBC AUTO: 90.1 FL (ref 80–99)
PLATELET # BLD AUTO: 380 10*3/MM3 (ref 150–450)
PMV BLD AUTO: 11.3 FL (ref 6–12)
POTASSIUM BLD-SCNC: 4.5 MMOL/L (ref 3.5–5.5)
PROT SERPL-MCNC: 7 G/DL (ref 5.7–8.2)
RBC # BLD AUTO: 4.14 10*6/MM3 (ref 3.89–5.14)
SODIUM BLD-SCNC: 130 MMOL/L (ref 132–146)
TSH SERPL DL<=0.05 MIU/L-ACNC: 2.78 MIU/ML (ref 0.35–5.35)
VIT B12 BLD-MCNC: 506 PG/ML (ref 211–911)
WBC NRBC COR # BLD: 8.51 10*3/MM3 (ref 3.5–10.8)

## 2019-02-25 PROCEDURE — 84443 ASSAY THYROID STIM HORMONE: CPT | Performed by: INTERNAL MEDICINE

## 2019-02-25 PROCEDURE — 99214 OFFICE O/P EST MOD 30 MIN: CPT | Performed by: INTERNAL MEDICINE

## 2019-02-25 PROCEDURE — 82607 VITAMIN B-12: CPT | Performed by: INTERNAL MEDICINE

## 2019-02-25 PROCEDURE — 85027 COMPLETE CBC AUTOMATED: CPT | Performed by: INTERNAL MEDICINE

## 2019-02-25 PROCEDURE — 82306 VITAMIN D 25 HYDROXY: CPT | Performed by: INTERNAL MEDICINE

## 2019-02-25 PROCEDURE — 90471 IMMUNIZATION ADMIN: CPT | Performed by: INTERNAL MEDICINE

## 2019-02-25 PROCEDURE — 90632 HEPA VACCINE ADULT IM: CPT | Performed by: INTERNAL MEDICINE

## 2019-02-25 PROCEDURE — 80053 COMPREHEN METABOLIC PANEL: CPT | Performed by: INTERNAL MEDICINE

## 2019-02-25 RX ORDER — CYCLOSPORINE 0.5 MG/ML
1 EMULSION OPHTHALMIC 2 TIMES DAILY
COMMUNITY

## 2019-03-16 DIAGNOSIS — I10 BENIGN ESSENTIAL HYPERTENSION: ICD-10-CM

## 2019-03-18 ENCOUNTER — TELEPHONE (OUTPATIENT)
Dept: INTERNAL MEDICINE | Facility: CLINIC | Age: 59
End: 2019-03-18

## 2019-03-18 DIAGNOSIS — E87.1 HYPONATREMIA: Primary | ICD-10-CM

## 2019-03-18 RX ORDER — HYDROCHLOROTHIAZIDE 25 MG/1
TABLET ORAL
Qty: 30 TABLET | Refills: 5 | Status: SHIPPED | OUTPATIENT
Start: 2019-03-18 | End: 2019-12-03 | Stop reason: SDUPTHER

## 2019-03-18 NOTE — TELEPHONE ENCOUNTER
----- Message from Eleanor Diggs MD sent at 3/18/2019  7:14 AM EDT -----  Regarding: lab results on My Chart  Can you let her know I sent her a detailed message on my chart with her lab results if she could look over it please

## 2019-03-19 RX ORDER — DESVENLAFAXINE SUCCINATE 50 MG/1
50 TABLET, EXTENDED RELEASE ORAL DAILY
Qty: 30 TABLET | Refills: 5 | Status: SHIPPED | OUTPATIENT
Start: 2019-03-19 | End: 2020-01-02

## 2019-05-20 DIAGNOSIS — I10 BENIGN ESSENTIAL HYPERTENSION: ICD-10-CM

## 2019-05-20 RX ORDER — LOSARTAN POTASSIUM 100 MG/1
TABLET ORAL
Qty: 30 TABLET | Refills: 0 | Status: SHIPPED | OUTPATIENT
Start: 2019-05-20 | End: 2019-05-31 | Stop reason: SDUPTHER

## 2019-05-20 NOTE — TELEPHONE ENCOUNTER
I had wanted her to stop by the office for a BMP to recheck her sodium since it was a little bit low.  Order is in if she could stop by for this.  I will send in just a 30-day to make sure we get the labs checked

## 2019-05-30 ENCOUNTER — LAB (OUTPATIENT)
Dept: INTERNAL MEDICINE | Facility: CLINIC | Age: 59
End: 2019-05-30

## 2019-05-30 DIAGNOSIS — E87.1 HYPONATREMIA: ICD-10-CM

## 2019-05-30 PROCEDURE — 80048 BASIC METABOLIC PNL TOTAL CA: CPT | Performed by: INTERNAL MEDICINE

## 2019-05-31 DIAGNOSIS — I10 BENIGN ESSENTIAL HYPERTENSION: ICD-10-CM

## 2019-05-31 LAB
ANION GAP SERPL CALCULATED.3IONS-SCNC: 11.9 MMOL/L
BUN BLD-MCNC: 13 MG/DL (ref 6–20)
BUN/CREAT SERPL: 14.3 (ref 7–25)
CALCIUM SPEC-SCNC: 9.4 MG/DL (ref 8.6–10.5)
CHLORIDE SERPL-SCNC: 97 MMOL/L (ref 98–107)
CO2 SERPL-SCNC: 26.1 MMOL/L (ref 22–29)
CREAT BLD-MCNC: 0.91 MG/DL (ref 0.57–1)
GFR SERPL CREATININE-BSD FRML MDRD: 64 ML/MIN/1.73
GLUCOSE BLD-MCNC: 103 MG/DL (ref 65–99)
POTASSIUM BLD-SCNC: 4.1 MMOL/L (ref 3.5–5.2)
SODIUM BLD-SCNC: 135 MMOL/L (ref 136–145)

## 2019-05-31 RX ORDER — LOSARTAN POTASSIUM 100 MG/1
100 TABLET ORAL DAILY
Qty: 90 TABLET | Refills: 1 | OUTPATIENT
Start: 2019-05-31

## 2019-05-31 RX ORDER — LOSARTAN POTASSIUM 100 MG/1
100 TABLET ORAL DAILY
Qty: 90 TABLET | Refills: 0 | Status: SHIPPED | OUTPATIENT
Start: 2019-05-31 | End: 2019-09-24 | Stop reason: SDUPTHER

## 2019-09-24 DIAGNOSIS — I10 BENIGN ESSENTIAL HYPERTENSION: ICD-10-CM

## 2019-09-24 DIAGNOSIS — D51.0 PERNICIOUS ANEMIA: ICD-10-CM

## 2019-09-24 RX ORDER — CYANOCOBALAMIN 1000 UG/ML
1000 INJECTION, SOLUTION INTRAMUSCULAR; SUBCUTANEOUS
Qty: 1 ML | Refills: 5 | Status: CANCELLED | OUTPATIENT
Start: 2019-09-24

## 2019-09-24 RX ORDER — CYANOCOBALAMIN 1000 UG/ML
1000 INJECTION, SOLUTION INTRAMUSCULAR; SUBCUTANEOUS
Qty: 1 ML | Refills: 0 | Status: SHIPPED | OUTPATIENT
Start: 2019-09-24 | End: 2019-12-30 | Stop reason: SDUPTHER

## 2019-09-24 RX ORDER — LOSARTAN POTASSIUM 100 MG/1
100 TABLET ORAL DAILY
Qty: 90 TABLET | Refills: 0 | Status: SHIPPED | OUTPATIENT
Start: 2019-09-24 | End: 2020-03-22 | Stop reason: SDUPTHER

## 2019-09-24 RX ORDER — LOSARTAN POTASSIUM 100 MG/1
TABLET ORAL
Qty: 90 TABLET | Refills: 0 | Status: SHIPPED | OUTPATIENT
Start: 2019-09-24 | End: 2019-12-05

## 2019-10-31 ENCOUNTER — OFFICE VISIT (OUTPATIENT)
Dept: INTERNAL MEDICINE | Facility: CLINIC | Age: 59
End: 2019-10-31

## 2019-10-31 VITALS
BODY MASS INDEX: 28.76 KG/M2 | HEIGHT: 65 IN | SYSTOLIC BLOOD PRESSURE: 126 MMHG | DIASTOLIC BLOOD PRESSURE: 82 MMHG | WEIGHT: 172.6 LBS | TEMPERATURE: 98 F

## 2019-10-31 DIAGNOSIS — Z23 NEED FOR HEPATITIS A IMMUNIZATION: ICD-10-CM

## 2019-10-31 DIAGNOSIS — F51.01 PRIMARY INSOMNIA: ICD-10-CM

## 2019-10-31 DIAGNOSIS — I10 BENIGN ESSENTIAL HYPERTENSION: ICD-10-CM

## 2019-10-31 DIAGNOSIS — E55.9 VITAMIN D DEFICIENCY: ICD-10-CM

## 2019-10-31 DIAGNOSIS — F41.1 ANXIETY STATE: Primary | ICD-10-CM

## 2019-10-31 DIAGNOSIS — M94.0 COSTOCHONDRITIS: ICD-10-CM

## 2019-10-31 LAB
25(OH)D3 SERPL-MCNC: 37.8 NG/ML (ref 30–100)
ALBUMIN SERPL-MCNC: 5.1 G/DL (ref 3.5–5.2)
ALBUMIN/GLOB SERPL: 1.9 G/DL
ALP SERPL-CCNC: 87 U/L (ref 39–117)
ALT SERPL W P-5'-P-CCNC: 16 U/L (ref 1–33)
ANION GAP SERPL CALCULATED.3IONS-SCNC: 13.2 MMOL/L (ref 5–15)
AST SERPL-CCNC: 19 U/L (ref 1–32)
BASOPHILS # BLD AUTO: 0.06 10*3/MM3 (ref 0–0.2)
BASOPHILS NFR BLD AUTO: 0.8 % (ref 0–1.5)
BILIRUB SERPL-MCNC: 0.3 MG/DL (ref 0.2–1.2)
BUN BLD-MCNC: 11 MG/DL (ref 6–20)
BUN/CREAT SERPL: 12.5 (ref 7–25)
CALCIUM SPEC-SCNC: 9.4 MG/DL (ref 8.6–10.5)
CHLORIDE SERPL-SCNC: 91 MMOL/L (ref 98–107)
CO2 SERPL-SCNC: 25.8 MMOL/L (ref 22–29)
CREAT BLD-MCNC: 0.88 MG/DL (ref 0.57–1)
DEPRECATED RDW RBC AUTO: 37.5 FL (ref 37–54)
EOSINOPHIL # BLD AUTO: 0.06 10*3/MM3 (ref 0–0.4)
EOSINOPHIL NFR BLD AUTO: 0.8 % (ref 0.3–6.2)
ERYTHROCYTE [DISTWIDTH] IN BLOOD BY AUTOMATED COUNT: 11.7 % (ref 12.3–15.4)
GFR SERPL CREATININE-BSD FRML MDRD: 66 ML/MIN/1.73
GLOBULIN UR ELPH-MCNC: 2.7 GM/DL
GLUCOSE BLD-MCNC: 95 MG/DL (ref 65–99)
HCT VFR BLD AUTO: 36.7 % (ref 34–46.6)
HGB BLD-MCNC: 12 G/DL (ref 12–15.9)
IMM GRANULOCYTES # BLD AUTO: 0.03 10*3/MM3 (ref 0–0.05)
IMM GRANULOCYTES NFR BLD AUTO: 0.4 % (ref 0–0.5)
LYMPHOCYTES # BLD AUTO: 1.76 10*3/MM3 (ref 0.7–3.1)
LYMPHOCYTES NFR BLD AUTO: 22.1 % (ref 19.6–45.3)
MCH RBC QN AUTO: 28.8 PG (ref 26.6–33)
MCHC RBC AUTO-ENTMCNC: 32.7 G/DL (ref 31.5–35.7)
MCV RBC AUTO: 88.2 FL (ref 79–97)
MONOCYTES # BLD AUTO: 0.65 10*3/MM3 (ref 0.1–0.9)
MONOCYTES NFR BLD AUTO: 8.2 % (ref 5–12)
NEUTROPHILS # BLD AUTO: 5.4 10*3/MM3 (ref 1.7–7)
NEUTROPHILS NFR BLD AUTO: 67.7 % (ref 42.7–76)
NRBC BLD AUTO-RTO: 0 /100 WBC (ref 0–0.2)
PLATELET # BLD AUTO: 353 10*3/MM3 (ref 140–450)
PMV BLD AUTO: 11.7 FL (ref 6–12)
POTASSIUM BLD-SCNC: 4.2 MMOL/L (ref 3.5–5.2)
PROT SERPL-MCNC: 7.8 G/DL (ref 6–8.5)
RBC # BLD AUTO: 4.16 10*6/MM3 (ref 3.77–5.28)
SODIUM BLD-SCNC: 130 MMOL/L (ref 136–145)
TSH SERPL DL<=0.05 MIU/L-ACNC: 4.19 UIU/ML (ref 0.27–4.2)
WBC NRBC COR # BLD: 7.96 10*3/MM3 (ref 3.4–10.8)

## 2019-10-31 PROCEDURE — 99214 OFFICE O/P EST MOD 30 MIN: CPT | Performed by: INTERNAL MEDICINE

## 2019-10-31 PROCEDURE — 90471 IMMUNIZATION ADMIN: CPT | Performed by: INTERNAL MEDICINE

## 2019-10-31 PROCEDURE — 82306 VITAMIN D 25 HYDROXY: CPT | Performed by: INTERNAL MEDICINE

## 2019-10-31 PROCEDURE — 90632 HEPA VACCINE ADULT IM: CPT | Performed by: INTERNAL MEDICINE

## 2019-10-31 PROCEDURE — 84443 ASSAY THYROID STIM HORMONE: CPT | Performed by: INTERNAL MEDICINE

## 2019-10-31 PROCEDURE — 80053 COMPREHEN METABOLIC PANEL: CPT | Performed by: INTERNAL MEDICINE

## 2019-10-31 PROCEDURE — 85025 COMPLETE CBC W/AUTO DIFF WBC: CPT | Performed by: INTERNAL MEDICINE

## 2019-10-31 RX ORDER — HYDROXYZINE HYDROCHLORIDE 10 MG/1
TABLET, FILM COATED ORAL
Qty: 45 TABLET | Refills: 5 | Status: SHIPPED | OUTPATIENT
Start: 2019-10-31 | End: 2019-12-05 | Stop reason: SINTOL

## 2019-10-31 NOTE — PROGRESS NOTES
Subjective   Po Edmondson is a 59 y.o. female.     History of Present Illness     Here for f/u on:  chest pain - over the last 6 months she feels it over center and left chest.  It started after she had a upper respiratory infection and was coughing a lot.  Hurts worse after she has helped lift/move her  father who is sick. She will occasionally take aleve or advil, or topical NSAID -these all seem to help.  She has done rhythm strip at work when she was feeling the pain and was OK. No SOB w/ it. No radiation into her neck and arm.   She has had a lot of stress w/ her father    htn - she does check at home and occasionally will get slightly high readings, 140s/80    Insomnia - she is on ambien but would like to get off. She had tried trazodone in the past but had felt a little hung over w/ it. She has about 25 tabs left.   She has tried 5mg at times, but it didn't help that much.  Dr. Cruz had written it for her but she is out on medical leave and her current gynecologist does not feel comfortable writing it    Depression - she is on pristiq - started it again. She had been on it earlier this year, and felt like it worked initially, but then felt like it wasn't, so had been back on the lexapro until the last 3 weeks.  She is starting to feel little bit better, but a lot of stress with her father's illness    Vit D def - she is taking D daily , may be 5000 units but patient not sure    Current Outpatient Medications on File Prior to Visit   Medication Sig Dispense Refill   • Chlorcyclizine-Pseudoephed (STAHIST AD) 25-60 MG tablet 1 qd- bid prn allergies 60 tablet 11   • cyanocobalamin 1000 MCG/ML injection Inject 1 mL into the appropriate muscle as directed by prescriber Every 28 (Twenty-Eight) Days. 1 mL 0   • cycloSPORINE (RESTASIS) 0.05 % ophthalmic emulsion 1 drop 2 (Two) Times a Day.     • desvenlafaxine (PRISTIQ) 50 MG 24 hr tablet Take 1 tablet by mouth Daily. 30 tablet 5   • estradiol (DIVIGEL) 1 MG/GM gel  "Divigel 1 mg/gram (0.1 %) transdermal gel packet     • fluticasone (FLONASE) 50 MCG/ACT nasal spray 2 sprays into the nostril(s) as directed by provider Daily. 3 bottle 3   • hydrochlorothiazide (HYDRODIURIL) 25 MG tablet TAKE 1 TABLET BY MOUTH ONCE DAILY 30 tablet 5   • losartan (COZAAR) 100 MG tablet TAKE 1 TABLET BY MOUTH ONCE DAILY 90 tablet 0   • losartan (COZAAR) 100 MG tablet Take 1 tablet by mouth Daily. 90 tablet 0   • progesterone (PROMETRIUM) 100 MG capsule Take 100 mg by mouth Every Night.     • zolpidem (AMBIEN) 10 MG tablet Take 10 mg by mouth at night as needed for sleep.       No current facility-administered medications on file prior to visit.        The following portions of the patient's history were reviewed and updated as appropriate: allergies, current medications, past family history, past medical history, past social history, past surgical history and problem list.    Review of Systems   Constitutional: Positive for fatigue and unexpected weight gain. Negative for activity change, appetite change and unexpected weight loss.   Respiratory: Negative for shortness of breath, wheezing and stridor.    Cardiovascular: Positive for chest pain. Negative for palpitations and leg swelling.   Gastrointestinal: Negative.    Allergic/Immunologic: Negative for immunocompromised state.   Neurological: Negative for tremors and syncope.   Psychiatric/Behavioral: Positive for dysphoric mood, sleep disturbance, depressed mood and stress. The patient is nervous/anxious.        Objective   /82 (BP Location: Right arm)   Temp 98 °F (36.7 °C) (Temporal)   Ht 164.1 cm (64.6\")   Wt 78.3 kg (172 lb 9.6 oz)   BMI 29.08 kg/m²   Physical Exam   Constitutional: She is oriented to person, place, and time. She appears well-developed and well-nourished. No distress.   HENT:   Head: Normocephalic and atraumatic.   Eyes: Conjunctivae and EOM are normal. Pupils are equal, round, and reactive to light. Right eye " exhibits no discharge. Left eye exhibits no discharge.   Neck: Normal range of motion. Neck supple.   Cardiovascular: Normal rate and regular rhythm.   Pulmonary/Chest: Effort normal and breath sounds normal. She exhibits tenderness (over costochondral joints B).   Abdominal: Soft. Bowel sounds are normal.   Musculoskeletal: She exhibits no edema.   Neurological: She is alert and oriented to person, place, and time. No cranial nerve deficit.   Skin: Skin is warm and dry. No rash noted. She is not diaphoretic.   Psychiatric: She has a normal mood and affect. Her behavior is normal. Judgment and thought content normal.   Nursing note and vitals reviewed.        Assessment/Plan   Po was seen today for chest pain, depression, anxiety and insomnia.    Diagnoses and all orders for this visit:    Anxiety state -we will have her continue the Pristiq and follow up in 1 month and see how she is doing.  If no better, we may switch this.  She also will try to see a counselor as well through her work.  Call if any worsening    Vitamin D deficiency -recheck levels today  -     Vitamin D 25 Hydroxy    Benign essential hypertension -good here in the office today.  She will continue to monitor and let me know if stays high  -     TSH  -     Comprehensive Metabolic Panel  -     CBC & Differential  -     CBC Auto Differential    Need for hepatitis A immunization  -     Hepatitis A Vaccine Adult IM    Primary insomnia -patient would like to try to go ahead and get off the Ambien.  I wrote out a schedule to wean the Ambien over 4 to 6 weeks.  We will have her alternate between the 10 and 5 the first week, then switch over to the 5 4 2 weeks, then do the 5 every other night until she is out of what she has.  She would like to have a back-up medication to use if she needs it.  She does not want to do trazodone again since it did make her feel hung over.  I will send in some hydroxyzine to try if needed    Costochondritis -her chest  pain is very typical for costochondritis.  She is tender over the joints on exam today.  She will continue to use the NSAIDs as needed.  We discussed getting a chest x-ray if symptoms do not improve at next follow-up in December      She had flu shot already

## 2019-12-03 DIAGNOSIS — I10 BENIGN ESSENTIAL HYPERTENSION: ICD-10-CM

## 2019-12-03 RX ORDER — HYDROCHLOROTHIAZIDE 25 MG/1
TABLET ORAL
Qty: 30 TABLET | Refills: 0 | Status: SHIPPED | OUTPATIENT
Start: 2019-12-03 | End: 2019-12-05

## 2019-12-04 NOTE — PROGRESS NOTES
Christine Edmondson is a 59 y.o. female.     History of Present Illness     Here for f/u on:    htn  - she has been on HCTZ and losartan. Her Na has been low the last several times we have checked - last month it was 130.  She has been off the HCTZ for about a week and BP was good when she checked earlier today    Chest wall pain - probable costochondritis -has resolved since her last visit here    Insomnia - we discussed an ambien wean last visit, and wrote for hydroxyzine to use if needed. She didn't like the hydroxyzine - so stopped. Currently getting about 4-5 hrs of sleep w/ the 5mg of ambien. She has 1 ambien left.    Depression - has been back on pristiq for the last 7-8 weeks. She is starting to feel a little better. Her father is doing a little better and this has helped some as well    Vit D def - D was good last check    TSH was 4.19 last check 5 weeks ago. Her mother has hypothryoid.  Patient does feel like she has gained some weight, feels more cold than she used to.  Some constipation, but no change from her baseline.  Does feel fatigue but she feels that is more from the poor sleep    Uri symptoms over the last few days - cough nonprod, no fever, drainage, ears feel full, taking stahist. Feels a little dizzy    Current Outpatient Medications on File Prior to Visit   Medication Sig Dispense Refill   • Chlorcyclizine-Pseudoephed (STAHIST AD) 25-60 MG tablet 1 qd- bid prn allergies 60 tablet 11   • cyanocobalamin 1000 MCG/ML injection Inject 1 mL into the appropriate muscle as directed by prescriber Every 28 (Twenty-Eight) Days. 1 mL 0   • cycloSPORINE (RESTASIS) 0.05 % ophthalmic emulsion 1 drop 2 (Two) Times a Day.     • desvenlafaxine (PRISTIQ) 50 MG 24 hr tablet Take 1 tablet by mouth Daily. 30 tablet 5   • estradiol (DIVIGEL) 1 MG/GM gel Divigel 1 mg/gram (0.1 %) transdermal gel packet     • fluticasone (FLONASE) 50 MCG/ACT nasal spray 2 sprays into the nostril(s) as directed by provider  "Daily. 3 bottle 3   • hydroCHLOROthiazide (HYDRODIURIL) 25 MG tablet TAKE 1 TABLET BY MOUTH ONCE DAILY 30 tablet 0   • hydrOXYzine (ATARAX) 10 MG tablet 1-2 qhs prn insomnia 45 tablet 5   • losartan (COZAAR) 100 MG tablet TAKE 1 TABLET BY MOUTH ONCE DAILY 90 tablet 0   • losartan (COZAAR) 100 MG tablet Take 1 tablet by mouth Daily. 90 tablet 0   • progesterone (PROMETRIUM) 100 MG capsule Take 100 mg by mouth Every Night.     • zolpidem (AMBIEN) 10 MG tablet Take 10 mg by mouth at night as needed for sleep.     • [DISCONTINUED] hydrochlorothiazide (HYDRODIURIL) 25 MG tablet TAKE 1 TABLET BY MOUTH ONCE DAILY 30 tablet 5     No current facility-administered medications on file prior to visit.        The following portions of the patient's history were reviewed and updated as appropriate: allergies, current medications, past family history, past medical history, past social history, past surgical history and problem list.    Review of Systems   Constitutional: Positive for fatigue and unexpected weight gain. Negative for unexpected weight loss.   HENT: Positive for congestion, postnasal drip and sore throat.    Gastrointestinal: Positive for constipation (no change).   Endocrine: Positive for cold intolerance.   Allergic/Immunologic: Negative for immunocompromised state.   Psychiatric/Behavioral: Positive for sleep disturbance and stress. Negative for dysphoric mood and depressed mood (better).       Objective   /74 (BP Location: Left arm, Patient Position: Sitting)   Pulse 84   Temp 96.9 °F (36.1 °C) (Temporal)   Ht 164.1 cm (64.6\")   Wt 79.5 kg (175 lb 3.2 oz)   SpO2 98%   BMI 29.52 kg/m²   Physical Exam   Constitutional: She is oriented to person, place, and time. She appears well-developed and well-nourished. No distress.   HENT:   Head: Normocephalic and atraumatic.   Right Ear: External ear normal.   Left Ear: External ear normal.   Mouth/Throat: No oropharyngeal exudate.   Op slightly erythematous but " no exudate or drainage. No sinus tenderness. Tm normal B   Eyes: Conjunctivae and EOM are normal. Pupils are equal, round, and reactive to light. Right eye exhibits no discharge. Left eye exhibits no discharge.   Neck: Normal range of motion. Neck supple. No thyromegaly present.   Cardiovascular: Normal rate and regular rhythm.   Pulmonary/Chest: Effort normal and breath sounds normal.   Musculoskeletal: She exhibits no edema.   Lymphadenopathy:     She has no cervical adenopathy.   Neurological: She is alert and oriented to person, place, and time. No cranial nerve deficit.   Skin: Skin is warm and dry. No rash noted. She is not diaphoretic.   Psychiatric: She has a normal mood and affect. Her behavior is normal. Judgment and thought content normal.   Nursing note and vitals reviewed.        Assessment/Plan   Po was seen today for hypertension, insomnia and depression.    Diagnoses and all orders for this visit:    Benign essential hypertension -good today even off the HCTZ.  We will continue her on just the losartan.  She will monitor and if readings start going up she will call me and we may try amlodipine.  We will recheck her sodium in 2 months off the HCTZ    Prolonged depressive adjustment reaction -doing okay with the Pristiq and we will continue for now    Abnormal TSH -does have some symptoms of hypothyroid and family history of hypothyroid.  We will go ahead and start 25 mcg of Synthroid and recheck levels in about 2 months  -     levothyroxine (SYNTHROID) 25 MCG tablet; Take 1 tablet by mouth Daily.    Primary insomnia-has been able to decrease her Ambien to 5 and she will stop this.  We will try trazodone instead  -     traZODone (DESYREL) 50 MG tablet; 1-2 qhs

## 2019-12-05 ENCOUNTER — OFFICE VISIT (OUTPATIENT)
Dept: INTERNAL MEDICINE | Facility: CLINIC | Age: 59
End: 2019-12-05

## 2019-12-05 VITALS
TEMPERATURE: 96.9 F | BODY MASS INDEX: 29.19 KG/M2 | WEIGHT: 175.2 LBS | DIASTOLIC BLOOD PRESSURE: 74 MMHG | HEIGHT: 65 IN | OXYGEN SATURATION: 98 % | HEART RATE: 84 BPM | SYSTOLIC BLOOD PRESSURE: 130 MMHG

## 2019-12-05 DIAGNOSIS — I10 BENIGN ESSENTIAL HYPERTENSION: Primary | ICD-10-CM

## 2019-12-05 DIAGNOSIS — R79.89 ABNORMAL TSH: ICD-10-CM

## 2019-12-05 DIAGNOSIS — F43.21 PROLONGED DEPRESSIVE ADJUSTMENT REACTION: ICD-10-CM

## 2019-12-05 DIAGNOSIS — F51.01 PRIMARY INSOMNIA: ICD-10-CM

## 2019-12-05 PROCEDURE — 99214 OFFICE O/P EST MOD 30 MIN: CPT | Performed by: INTERNAL MEDICINE

## 2019-12-05 RX ORDER — TRAZODONE HYDROCHLORIDE 50 MG/1
TABLET ORAL
Qty: 45 TABLET | Refills: 5 | Status: SHIPPED | OUTPATIENT
Start: 2019-12-05 | End: 2019-12-17

## 2019-12-05 RX ORDER — LEVOTHYROXINE SODIUM 0.03 MG/1
25 TABLET ORAL DAILY
Qty: 90 TABLET | Refills: 0 | Status: SHIPPED | OUTPATIENT
Start: 2019-12-05 | End: 2020-02-07 | Stop reason: SDUPTHER

## 2019-12-17 RX ORDER — ZOLPIDEM TARTRATE 5 MG/1
5 TABLET ORAL NIGHTLY PRN
Qty: 30 TABLET | Refills: 1 | Status: SHIPPED | OUTPATIENT
Start: 2019-12-17 | End: 2020-02-06

## 2019-12-30 DIAGNOSIS — D51.0 PERNICIOUS ANEMIA: ICD-10-CM

## 2019-12-30 RX ORDER — CYANOCOBALAMIN 1000 UG/ML
1000 INJECTION, SOLUTION INTRAMUSCULAR; SUBCUTANEOUS
Qty: 10 ML | Refills: 1 | Status: SHIPPED | OUTPATIENT
Start: 2019-12-30 | End: 2021-10-21 | Stop reason: SDUPTHER

## 2020-01-02 RX ORDER — DESVENLAFAXINE SUCCINATE 50 MG/1
TABLET, EXTENDED RELEASE ORAL
Qty: 30 TABLET | Refills: 1 | Status: SHIPPED | OUTPATIENT
Start: 2020-01-02 | End: 2020-03-05

## 2020-01-07 ENCOUNTER — TELEPHONE (OUTPATIENT)
Dept: INTERNAL MEDICINE | Facility: CLINIC | Age: 60
End: 2020-01-07

## 2020-01-07 NOTE — TELEPHONE ENCOUNTER
[facility] at [facility] was approved by insurance.  Approval from 12/8/19 - 1/6/2021.  Case ID: [id number].

## 2020-01-13 ENCOUNTER — TELEPHONE (OUTPATIENT)
Dept: INTERNAL MEDICINE | Facility: CLINIC | Age: 60
End: 2020-01-13

## 2020-01-13 NOTE — TELEPHONE ENCOUNTER
PT has a refill on Ambien and will be leaving the state day after tomorrow. Pt wanted to know if she could have an early refill to take with her on her trip?

## 2020-02-06 ENCOUNTER — OFFICE VISIT (OUTPATIENT)
Dept: INTERNAL MEDICINE | Facility: CLINIC | Age: 60
End: 2020-02-06

## 2020-02-06 VITALS
SYSTOLIC BLOOD PRESSURE: 132 MMHG | TEMPERATURE: 97 F | HEIGHT: 65 IN | HEART RATE: 69 BPM | BODY MASS INDEX: 29.72 KG/M2 | DIASTOLIC BLOOD PRESSURE: 84 MMHG | WEIGHT: 178.4 LBS | OXYGEN SATURATION: 98 %

## 2020-02-06 DIAGNOSIS — F51.01 PRIMARY INSOMNIA: ICD-10-CM

## 2020-02-06 DIAGNOSIS — E03.9 ACQUIRED HYPOTHYROIDISM: ICD-10-CM

## 2020-02-06 DIAGNOSIS — I10 BENIGN ESSENTIAL HYPERTENSION: Primary | ICD-10-CM

## 2020-02-06 LAB
ANION GAP SERPL CALCULATED.3IONS-SCNC: 15.8 MMOL/L (ref 5–15)
BUN BLD-MCNC: 20 MG/DL (ref 6–20)
BUN/CREAT SERPL: 21.1 (ref 7–25)
CALCIUM SPEC-SCNC: 9.5 MG/DL (ref 8.6–10.5)
CHLORIDE SERPL-SCNC: 97 MMOL/L (ref 98–107)
CO2 SERPL-SCNC: 22.2 MMOL/L (ref 22–29)
CREAT BLD-MCNC: 0.95 MG/DL (ref 0.57–1)
GFR SERPL CREATININE-BSD FRML MDRD: 61 ML/MIN/1.73
GLUCOSE BLD-MCNC: 92 MG/DL (ref 65–99)
POTASSIUM BLD-SCNC: 4.3 MMOL/L (ref 3.5–5.2)
SODIUM BLD-SCNC: 135 MMOL/L (ref 136–145)
T4 FREE SERPL-MCNC: 1.06 NG/DL (ref 0.93–1.7)
TSH SERPL DL<=0.05 MIU/L-ACNC: 2.96 UIU/ML (ref 0.27–4.2)

## 2020-02-06 PROCEDURE — 80048 BASIC METABOLIC PNL TOTAL CA: CPT | Performed by: INTERNAL MEDICINE

## 2020-02-06 PROCEDURE — 84443 ASSAY THYROID STIM HORMONE: CPT | Performed by: INTERNAL MEDICINE

## 2020-02-06 PROCEDURE — 99214 OFFICE O/P EST MOD 30 MIN: CPT | Performed by: INTERNAL MEDICINE

## 2020-02-06 PROCEDURE — 84439 ASSAY OF FREE THYROXINE: CPT | Performed by: INTERNAL MEDICINE

## 2020-02-06 RX ORDER — DESVENLAFAXINE SUCCINATE 50 MG/1
50 TABLET, EXTENDED RELEASE ORAL DAILY
Qty: 30 TABLET | Refills: 1 | Status: CANCELLED | OUTPATIENT
Start: 2020-02-06

## 2020-02-06 RX ORDER — CHOLECALCIFEROL (VITAMIN D3) 50 MCG
2000 TABLET ORAL DAILY
COMMUNITY

## 2020-02-06 RX ORDER — ZOLPIDEM TARTRATE 6.25 MG/1
6.25 TABLET, FILM COATED, EXTENDED RELEASE ORAL NIGHTLY PRN
Qty: 30 TABLET | Refills: 2 | Status: SHIPPED | OUTPATIENT
Start: 2020-02-06 | End: 2020-04-27

## 2020-02-06 RX ORDER — ZOLPIDEM TARTRATE 5 MG/1
5 TABLET ORAL NIGHTLY PRN
Qty: 30 TABLET | Refills: 1 | Status: CANCELLED | OUTPATIENT
Start: 2020-02-06

## 2020-02-06 RX ORDER — LOSARTAN POTASSIUM 100 MG/1
100 TABLET ORAL DAILY
Qty: 90 TABLET | Refills: 0 | Status: CANCELLED | OUTPATIENT
Start: 2020-02-06

## 2020-02-06 NOTE — PROGRESS NOTES
Answers for HPI/ROS submitted by the patient on 1/30/2020   What is the primary reason for your visit?: Other  Please describe your symptoms.: Follow up on B/p since off HCTZ. Also follow up on new thyroid medication. Labs. And medication refills on sleeping meds  Have you had these symptoms before?: Yes  How long have you been having these symptoms?: Other  Please list any medications you are currently taking for this condition.: B/p -losartan 100 mg.    Thyroid - Levothyroxin 25mcq .   Ambien  5 mg  Please describe any probable cause for these symptoms. : Unknown      Subjective   Po Edmondson is a 59 y.o. female.     History of Present Illness     Here for f/u on:    Hypertension-she is on losartan and off the hydrochlorothiazide as she had some hyponatremia with it.she is starting a new diet w/ some friends    Insomnia-we have tried several different medications including trazodone and hydroxyzine but patient did not sleep well with the so is back on Ambien 5 mg. This helps her fall asleep, but does tend to wake up a few times. Feels like she gets about 4 hrs of sleep    Hypothyroid- we did start Synthroid 2 months ago.she is taking regularly. Can't tell a difference so far    Depression-she is on Pristiq and this is helping    Current Outpatient Medications on File Prior to Visit   Medication Sig Dispense Refill   • Chlorcyclizine-Pseudoephed (STAHIST AD) 25-60 MG tablet 1 qd- bid prn allergies 60 tablet 11   • cyanocobalamin 1000 MCG/ML injection Inject 1 mL into the appropriate muscle as directed by prescriber Every 28 (Twenty-Eight) Days. 10 mL 1   • cycloSPORINE (RESTASIS) 0.05 % ophthalmic emulsion 1 drop 2 (Two) Times a Day.     • desvenlafaxine (PRISTIQ) 50 MG 24 hr tablet TAKE 1 TABLET BY MOUTH ONCE DAILY 30 tablet 1   • estradiol (DIVIGEL) 1 MG/GM gel Divigel 1 mg/gram (0.1 %) transdermal gel packet     • fluticasone (FLONASE) 50 MCG/ACT nasal spray 2 sprays into the nostril(s) as directed by provider  "Daily. 3 bottle 3   • levothyroxine (SYNTHROID) 25 MCG tablet Take 1 tablet by mouth Daily. 90 tablet 0   • losartan (COZAAR) 100 MG tablet Take 1 tablet by mouth Daily. 90 tablet 0   • progesterone (PROMETRIUM) 100 MG capsule Take 100 mg by mouth Every Night.     • zolpidem (AMBIEN) 5 MG tablet Take 1 tablet by mouth At Night As Needed for Sleep. 30 tablet 1     No current facility-administered medications on file prior to visit.        The following portions of the patient's history were reviewed and updated as appropriate: allergies, current medications, past family history, past medical history, past social history, past surgical history and problem list.    Review of Systems   Constitutional: Negative for fever.   Respiratory: Negative for shortness of breath.    Cardiovascular: Negative for chest pain.   Skin: Negative.    Allergic/Immunologic: Negative for immunocompromised state.   Neurological: Negative for seizures, memory problem and confusion.   Psychiatric/Behavioral: Positive for decreased concentration, sleep disturbance, depressed mood and stress. Negative for agitation.       Objective   /84 (BP Location: Left arm, Patient Position: Sitting)   Pulse 69   Temp 97 °F (36.1 °C) (Temporal)   Ht 164.1 cm (64.6\")   Wt 80.9 kg (178 lb 6.4 oz)   SpO2 98%   BMI 30.06 kg/m²   Physical Exam   Constitutional: She is oriented to person, place, and time. She appears well-developed and well-nourished. No distress.   HENT:   Head: Normocephalic and atraumatic.   Eyes: Pupils are equal, round, and reactive to light. Conjunctivae and EOM are normal. Right eye exhibits no discharge. Left eye exhibits no discharge.   Neck: Normal range of motion. Neck supple.   Cardiovascular: Normal rate and regular rhythm.   Pulmonary/Chest: Effort normal and breath sounds normal.   Musculoskeletal: She exhibits no edema.   Neurological: She is alert and oriented to person, place, and time. No cranial nerve deficit. "   Skin: Skin is warm and dry. No rash noted. She is not diaphoretic.   Psychiatric: She has a normal mood and affect. Her behavior is normal. Judgment and thought content normal.   Nursing note and vitals reviewed.        Assessment/Plan   Po was seen today for hypertension, hypothyroidism, insomnia, depression and med refill.    Diagnoses and all orders for this visit:    Benign essential hypertension-good control.  We will recheck BMP today  -     Basic Metabolic Panel    Acquired hypothyroidism-check today  -     TSH  -     T4, Free    Primary insomnia-we will try CR to see if this keeps her asleep longer.Controlled substance contract reviewed and signed by pt. Adverse effects and risks of addiction of medication reviewed with pt. Fantasma done today and appropriate.  She has used phentermine in the past and I discussed that this could interfere with sleep and also raise her blood pressure.  She has not been on it for the last 2 months as she did not really feel like it helped anyway  -     zolpidem CR (AMBIEN CR) 6.25 MG CR tablet; Take 1 tablet by mouth At Night As Needed for Sleep.

## 2020-02-07 DIAGNOSIS — R79.89 ABNORMAL TSH: ICD-10-CM

## 2020-02-07 RX ORDER — LEVOTHYROXINE SODIUM 0.03 MG/1
25 TABLET ORAL DAILY
Qty: 90 TABLET | Refills: 1 | Status: SHIPPED | OUTPATIENT
Start: 2020-02-07 | End: 2020-07-21 | Stop reason: SDUPTHER

## 2020-03-05 RX ORDER — DESVENLAFAXINE SUCCINATE 50 MG/1
TABLET, EXTENDED RELEASE ORAL
Qty: 90 TABLET | Refills: 1 | Status: SHIPPED | OUTPATIENT
Start: 2020-03-05 | End: 2020-10-01

## 2020-03-22 DIAGNOSIS — I10 BENIGN ESSENTIAL HYPERTENSION: ICD-10-CM

## 2020-03-23 RX ORDER — LOSARTAN POTASSIUM 100 MG/1
100 TABLET ORAL DAILY
Qty: 90 TABLET | Refills: 0 | Status: SHIPPED | OUTPATIENT
Start: 2020-03-23 | End: 2020-07-01 | Stop reason: SDUPTHER

## 2020-04-27 ENCOUNTER — TELEMEDICINE (OUTPATIENT)
Dept: INTERNAL MEDICINE | Facility: CLINIC | Age: 60
End: 2020-04-27

## 2020-04-27 DIAGNOSIS — F51.01 PRIMARY INSOMNIA: Primary | ICD-10-CM

## 2020-04-27 DIAGNOSIS — E03.9 ACQUIRED HYPOTHYROIDISM: ICD-10-CM

## 2020-04-27 DIAGNOSIS — I10 BENIGN ESSENTIAL HYPERTENSION: ICD-10-CM

## 2020-04-27 PROCEDURE — 99214 OFFICE O/P EST MOD 30 MIN: CPT | Performed by: INTERNAL MEDICINE

## 2020-04-27 RX ORDER — ZOLPIDEM TARTRATE 12.5 MG/1
12.5 TABLET, FILM COATED, EXTENDED RELEASE ORAL NIGHTLY PRN
Qty: 30 TABLET | Refills: 2 | Status: SHIPPED | OUTPATIENT
Start: 2020-04-27 | End: 2020-07-20

## 2020-04-27 NOTE — PROGRESS NOTES
Christine Edmondson is a 59 y.o. female.     History of Present Illness     Video Visit was done today because of COVID-19.  patient has consented to receive care via Video Visit   Here for f/u on:    Insomnia - she does like the ambien cr better than the plain ambien, but does still wake up a few times so wondering if she could do the higher dose      htn - not checking often, but generally has been in the 130s/70s. Has been active w/ yard work and helping her parents, but not walking as much. Diet generally good- follows DASH. She is not on the phentermine right now, did take for a few months from wt loss clinic and lost a few pounds    Hypothyroid - has had some more hair thinning. She thinks weight is down a few pounds.  She also did end up stopping her Evamist.  She stopped it about 3 weeks ago and really has not had too much increase in hot flashes.    Current Outpatient Medications on File Prior to Visit   Medication Sig Dispense Refill   • Chlorcyclizine-Pseudoephed (STAHIST AD) 25-60 MG tablet 1 qd- bid prn allergies 60 tablet 11   • Cholecalciferol (VITAMIN D) 50 MCG (2000 UT) tablet Take 2,000 Units by mouth Daily.     • cyanocobalamin 1000 MCG/ML injection Inject 1 mL into the appropriate muscle as directed by prescriber Every 28 (Twenty-Eight) Days. 10 mL 1   • cycloSPORINE (RESTASIS) 0.05 % ophthalmic emulsion 1 drop 2 (Two) Times a Day.     • desvenlafaxine (PRISTIQ) 50 MG 24 hr tablet Take 1 tablet by mouth once daily 90 tablet 1   • estradiol (EVAMIST) 1.53 MG/SPRAY transdermal spray Place 1 spray on the skin as directed by provider Daily.     • fluticasone (FLONASE) 50 MCG/ACT nasal spray 2 sprays into the nostril(s) as directed by provider Daily. 3 bottle 3   • levothyroxine (SYNTHROID) 25 MCG tablet Take 1 tablet by mouth Daily. 90 tablet 1   • losartan (COZAAR) 100 MG tablet Take 1 tablet by mouth Daily. 90 tablet 0   • MULTIPLE VITAMINS PO Take  by mouth.     • progesterone (PROMETRIUM)  "100 MG capsule Take 100 mg by mouth Every Night.     • Syringe/Needle, Disp, (Luer Lock Safety Syringes) 25G X 5/8\" 3 ML misc 1 each Every 30 (Thirty) Days. 50 each 0   • zolpidem CR (AMBIEN CR) 6.25 MG CR tablet Take 1 tablet by mouth At Night As Needed for Sleep. 30 tablet 2     No current facility-administered medications on file prior to visit.        The following portions of the patient's history were reviewed and updated as appropriate: allergies, current medications, past family history, past medical history, past social history, past surgical history and problem list.    Review of Systems    Objective   There were no vitals taken for this visit.  Physical Exam  Physical Exam   Constitutional:  oriented to person, place, and time.  appears well-developed and well-nourished. No distress.   HENT:   Head: Normocephalic and atraumatic.   Nose: Nose normal.   Eyes: Pupils are equal, round, and reactive to light. Conjunctivae and EOM are normal. Right eye exhibits no discharge. Left eye exhibits no discharge. No scleral icterus.   Neurological: alert and oriented to person, place, and time. No cranial nerve deficit.   Skin: Skin is warm and dry. No rash noted. not diaphoretic. No erythema.   Psychiatric:  normal mood and affect. behavior is normal. Judgment and thought content normal.     Assessment/Plan   Diagnoses and all orders for this visit:    Primary insomnia-we will go ahead and try the 12.5 dose to see if she sleeps better. Pt has already signed controlled substance contract. Fantasma done today and appropriate.  -     zolpidem CR (Ambien CR) 12.5 MG CR tablet; Take 1 tablet by mouth At Night As Needed for Sleep.    Benign essential hypertension-she has not checked recently, so she will start checking and send me readings in about a week.    Acquired hypothyroidism-we will plan to recheck TSH in July               "

## 2020-07-01 DIAGNOSIS — I10 BENIGN ESSENTIAL HYPERTENSION: ICD-10-CM

## 2020-07-02 RX ORDER — LOSARTAN POTASSIUM 100 MG/1
100 TABLET ORAL DAILY
Qty: 90 TABLET | Refills: 0 | Status: SHIPPED | OUTPATIENT
Start: 2020-07-02 | End: 2020-10-01

## 2020-07-18 NOTE — PROGRESS NOTES
"Subjective      Po Edmondson is a 59 y.o. female.     History of Present Illness     Here for f/u on:    Insomnia - she is on ambien cr , but feels like she drags in the mornings. She takes it at 10pm, and has to get up around 5:30am. She feels like the plain ambien worked a little better for her.  Did wake up some with it but did not feel hung over the next day      htn - Bp's have been good when she checks - in the 1102/70s. She has been active - yard work/golf  She is on phentermine now from wt loss clinic.  She does not take it every day and does not feel like it helps a whole lot but does help some and does not seem to be raising her blood pressure     Hypothyroid -weight is down a few pounds, and she feels like energy level is okay.  Has noticed her hair is thinning some.      Current Outpatient Medications on File Prior to Visit   Medication Sig Dispense Refill   • Chlorcyclizine-Pseudoephed (STAHIST AD) 25-60 MG tablet 1 qd- bid prn allergies 60 tablet 11   • Cholecalciferol (VITAMIN D) 50 MCG (2000 UT) tablet Take 2,000 Units by mouth Daily.     • cyanocobalamin 1000 MCG/ML injection Inject 1 mL into the appropriate muscle as directed by prescriber Every 28 (Twenty-Eight) Days. 10 mL 1   • cycloSPORINE (RESTASIS) 0.05 % ophthalmic emulsion 1 drop 2 (Two) Times a Day.     • desvenlafaxine (PRISTIQ) 50 MG 24 hr tablet Take 1 tablet by mouth once daily 90 tablet 1   • fluticasone (FLONASE) 50 MCG/ACT nasal spray 2 sprays into the nostril(s) as directed by provider Daily. 3 bottle 3   • levothyroxine (SYNTHROID) 25 MCG tablet Take 1 tablet by mouth Daily. 90 tablet 1   • losartan (COZAAR) 100 MG tablet Take 1 tablet by mouth Daily. 90 tablet 0   • MULTIPLE VITAMINS PO Take  by mouth.     • progesterone (PROMETRIUM) 100 MG capsule Take 100 mg by mouth Every Night.     • Syringe/Needle, Disp, (Luer Lock Safety Syringes) 25G X 5/8\" 3 ML misc 1 each Every 30 (Thirty) Days. 50 each 0   • zolpidem CR (Ambien CR) 12.5 " "MG CR tablet Take 1 tablet by mouth At Night As Needed for Sleep. 30 tablet 2     No current facility-administered medications on file prior to visit.        The following portions of the patient's history were reviewed and updated as appropriate: allergies, current medications, past family history, past medical history, past social history, past surgical history and problem list.    Review of Systems   Constitutional: Positive for activity change (increased) and unexpected weight loss. Negative for fever and unexpected weight gain.   Respiratory: Negative for shortness of breath.    Cardiovascular: Negative for chest pain.   Skin: Negative.         Hair thinning   Allergic/Immunologic: Negative for immunocompromised state.   Neurological: Negative for seizures, memory problem and confusion.   Psychiatric/Behavioral: Negative for agitation.       Objective   /84   Pulse 74   Temp 97.3 °F (36.3 °C)   Ht 164.1 cm (64.6\")   Wt 78 kg (172 lb)   SpO2 98%   BMI 28.98 kg/m²   Physical Exam   Constitutional: She is oriented to person, place, and time. She appears well-developed and well-nourished. No distress.   HENT:   Head: Normocephalic and atraumatic.   Eyes: Pupils are equal, round, and reactive to light. Conjunctivae and EOM are normal. Right eye exhibits no discharge. Left eye exhibits no discharge.   Neck: Normal range of motion. Neck supple. No thyromegaly present.   Cardiovascular: Normal rate and regular rhythm.   Pulmonary/Chest: Effort normal and breath sounds normal.   Musculoskeletal: She exhibits no edema.   Neurological: She is alert and oriented to person, place, and time. No cranial nerve deficit.   Skin: Skin is warm and dry. No rash noted. She is not diaphoretic.   Psychiatric: She has a normal mood and affect. Her behavior is normal. Judgment and thought content normal.   Nursing note and vitals reviewed.        Assessment/Plan   Po was seen today for hypertension, hypothyroidism, " depression and insomnia.    Diagnoses and all orders for this visit:    Primary insomnia - we will switch back to Ambien 10 mg.Pt has already signed controlled substance contract. Fantasma done today and appropriate.  -     zolpidem (AMBIEN) 10 MG tablet; Take 1 tablet by mouth At Night As Needed for Sleep.    Acquired hypothyroidism-recheck levels today  -     TSH; Future    Benign essential hypertension-good control.  She would also like a cholesterol panel today, and she is fasting  -     Comprehensive Metabolic Panel; Future  -     Lipid Panel; Future

## 2020-07-20 ENCOUNTER — OFFICE VISIT (OUTPATIENT)
Dept: INTERNAL MEDICINE | Facility: CLINIC | Age: 60
End: 2020-07-20

## 2020-07-20 ENCOUNTER — LAB (OUTPATIENT)
Dept: LAB | Facility: HOSPITAL | Age: 60
End: 2020-07-20

## 2020-07-20 VITALS
BODY MASS INDEX: 28.66 KG/M2 | WEIGHT: 172 LBS | HEART RATE: 74 BPM | TEMPERATURE: 97.3 F | OXYGEN SATURATION: 98 % | SYSTOLIC BLOOD PRESSURE: 124 MMHG | HEIGHT: 65 IN | DIASTOLIC BLOOD PRESSURE: 84 MMHG

## 2020-07-20 DIAGNOSIS — I10 BENIGN ESSENTIAL HYPERTENSION: ICD-10-CM

## 2020-07-20 DIAGNOSIS — E03.9 ACQUIRED HYPOTHYROIDISM: ICD-10-CM

## 2020-07-20 DIAGNOSIS — F51.01 PRIMARY INSOMNIA: Primary | ICD-10-CM

## 2020-07-20 LAB
ALBUMIN SERPL-MCNC: 4.9 G/DL (ref 3.5–5.2)
ALBUMIN/GLOB SERPL: 1.8 G/DL
ALP SERPL-CCNC: 81 U/L (ref 39–117)
ALT SERPL W P-5'-P-CCNC: 18 U/L (ref 1–33)
ANION GAP SERPL CALCULATED.3IONS-SCNC: 10.7 MMOL/L (ref 5–15)
AST SERPL-CCNC: 19 U/L (ref 1–32)
BILIRUB SERPL-MCNC: 0.6 MG/DL (ref 0–1.2)
BUN SERPL-MCNC: 13 MG/DL (ref 6–20)
BUN/CREAT SERPL: 13.7 (ref 7–25)
CALCIUM SPEC-SCNC: 9.4 MG/DL (ref 8.6–10.5)
CHLORIDE SERPL-SCNC: 94 MMOL/L (ref 98–107)
CHOLEST SERPL-MCNC: 222 MG/DL (ref 0–200)
CO2 SERPL-SCNC: 25.3 MMOL/L (ref 22–29)
CREAT SERPL-MCNC: 0.95 MG/DL (ref 0.57–1)
GFR SERPL CREATININE-BSD FRML MDRD: 61 ML/MIN/1.73
GLOBULIN UR ELPH-MCNC: 2.7 GM/DL
GLUCOSE SERPL-MCNC: 98 MG/DL (ref 65–99)
HDLC SERPL-MCNC: 84 MG/DL (ref 40–60)
LDLC SERPL CALC-MCNC: 119 MG/DL (ref 0–100)
LDLC/HDLC SERPL: 1.41 {RATIO}
POTASSIUM SERPL-SCNC: 4.8 MMOL/L (ref 3.5–5.2)
PROT SERPL-MCNC: 7.6 G/DL (ref 6–8.5)
SODIUM SERPL-SCNC: 130 MMOL/L (ref 136–145)
TRIGL SERPL-MCNC: 97 MG/DL (ref 0–150)
TSH SERPL DL<=0.05 MIU/L-ACNC: 1.25 UIU/ML (ref 0.27–4.2)
VLDLC SERPL-MCNC: 19.4 MG/DL (ref 5–40)

## 2020-07-20 PROCEDURE — 84443 ASSAY THYROID STIM HORMONE: CPT

## 2020-07-20 PROCEDURE — 80061 LIPID PANEL: CPT

## 2020-07-20 PROCEDURE — 80053 COMPREHEN METABOLIC PANEL: CPT

## 2020-07-20 PROCEDURE — 99214 OFFICE O/P EST MOD 30 MIN: CPT | Performed by: INTERNAL MEDICINE

## 2020-07-20 RX ORDER — PHENTERMINE HYDROCHLORIDE 37.5 MG/1
37.5 CAPSULE ORAL AS NEEDED
COMMUNITY
End: 2023-02-15

## 2020-07-20 RX ORDER — ZOLPIDEM TARTRATE 10 MG/1
10 TABLET ORAL NIGHTLY PRN
Qty: 30 TABLET | Refills: 2 | Status: SHIPPED | OUTPATIENT
Start: 2020-07-20 | End: 2020-10-15 | Stop reason: SDUPTHER

## 2020-07-21 DIAGNOSIS — R79.89 ABNORMAL TSH: ICD-10-CM

## 2020-07-21 RX ORDER — LEVOTHYROXINE SODIUM 0.03 MG/1
25 TABLET ORAL DAILY
Qty: 90 TABLET | Refills: 1 | Status: SHIPPED | OUTPATIENT
Start: 2020-07-21 | End: 2021-01-17 | Stop reason: SDUPTHER

## 2020-09-30 DIAGNOSIS — I10 BENIGN ESSENTIAL HYPERTENSION: ICD-10-CM

## 2020-10-01 DIAGNOSIS — I10 BENIGN ESSENTIAL HYPERTENSION: ICD-10-CM

## 2020-10-01 RX ORDER — LOSARTAN POTASSIUM 100 MG/1
100 TABLET ORAL DAILY
Qty: 90 TABLET | Refills: 0 | OUTPATIENT
Start: 2020-10-01

## 2020-10-01 RX ORDER — DESVENLAFAXINE SUCCINATE 50 MG/1
TABLET, EXTENDED RELEASE ORAL
Qty: 90 TABLET | Refills: 3 | Status: SHIPPED | OUTPATIENT
Start: 2020-10-01 | End: 2021-04-07

## 2020-10-01 RX ORDER — DESVENLAFAXINE SUCCINATE 50 MG/1
50 TABLET, EXTENDED RELEASE ORAL DAILY
Qty: 90 TABLET | Refills: 1 | OUTPATIENT
Start: 2020-10-01

## 2020-10-01 RX ORDER — LOSARTAN POTASSIUM 100 MG/1
TABLET ORAL
Qty: 90 TABLET | Refills: 0 | Status: SHIPPED | OUTPATIENT
Start: 2020-10-01 | End: 2021-01-06 | Stop reason: SDUPTHER

## 2020-10-15 ENCOUNTER — OFFICE VISIT (OUTPATIENT)
Dept: INTERNAL MEDICINE | Facility: CLINIC | Age: 60
End: 2020-10-15

## 2020-10-15 VITALS
HEART RATE: 91 BPM | BODY MASS INDEX: 27.72 KG/M2 | OXYGEN SATURATION: 99 % | DIASTOLIC BLOOD PRESSURE: 70 MMHG | SYSTOLIC BLOOD PRESSURE: 134 MMHG | HEIGHT: 65 IN | WEIGHT: 166.4 LBS | TEMPERATURE: 97.3 F

## 2020-10-15 DIAGNOSIS — M79.604 PAIN IN BOTH LOWER EXTREMITIES: ICD-10-CM

## 2020-10-15 DIAGNOSIS — F41.9 ANXIETY: ICD-10-CM

## 2020-10-15 DIAGNOSIS — M79.605 PAIN IN BOTH LOWER EXTREMITIES: ICD-10-CM

## 2020-10-15 DIAGNOSIS — F51.01 PRIMARY INSOMNIA: ICD-10-CM

## 2020-10-15 DIAGNOSIS — E03.9 ACQUIRED HYPOTHYROIDISM: ICD-10-CM

## 2020-10-15 DIAGNOSIS — R51.9 DAILY HEADACHE: ICD-10-CM

## 2020-10-15 DIAGNOSIS — I10 BENIGN ESSENTIAL HYPERTENSION: Primary | ICD-10-CM

## 2020-10-15 PROCEDURE — 99214 OFFICE O/P EST MOD 30 MIN: CPT | Performed by: INTERNAL MEDICINE

## 2020-10-15 RX ORDER — ESTRADIOL 1.53 MG/1
1 SPRAY TRANSDERMAL DAILY
COMMUNITY
Start: 2020-08-28 | End: 2021-01-14

## 2020-10-15 RX ORDER — ZOLPIDEM TARTRATE 10 MG/1
10 TABLET ORAL NIGHTLY PRN
Qty: 30 TABLET | Refills: 2 | Status: SHIPPED | OUTPATIENT
Start: 2020-10-15 | End: 2021-01-06 | Stop reason: SDUPTHER

## 2020-10-15 RX ORDER — FLUOROURACIL 50 MG/G
1 CREAM TOPICAL DAILY PRN
COMMUNITY
Start: 2020-09-23

## 2020-10-15 NOTE — PROGRESS NOTES
Subjective   Po Edmondson is a 60 y.o. female.     History of Present Illness     Here for f/u on:    Insomnia - she is using ambien 10 and this does help    Hypothyroid - we did labs in 7/20 and tsh looked good in 1 range. Wt down some and she is trying to lose. BM regular.    htn - she os on losartan and HCTZ and has been under 120s/60s. generally    Overweight - she sees weight loss clinic and Takes phentermine a few times a week and has lost a few pounds    She started back on HRT in 8/20 as she was having more hair loss, and has helped some. Will see GYN again later this month ([facility])    HAs - has been getting more over the summer, daily recently. Had a migraine this week and had to miss work, felt nauseated and had to lie down.no previous h/omigraines.  She has been trying ibuprofen and sinus med - help some but not always. Some allergy symptoms too, with ears feeling full  No nuerologic symptoms. She is wondering if it could be from the pristiq, or the HRT    Leg pain B at night over last year, though not every night.  legs aching like a toothache. Does get cramps too, but this is different. She is on D though has missed some doses recently    Current Outpatient Medications on File Prior to Visit   Medication Sig Dispense Refill   • Chlorcyclizine-Pseudoephed (STAHIST AD) 25-60 MG tablet 1 qd- bid prn allergies 60 tablet 11   • Cholecalciferol (VITAMIN D) 50 MCG (2000 UT) tablet Take 2,000 Units by mouth Daily.     • cyanocobalamin 1000 MCG/ML injection Inject 1 mL into the appropriate muscle as directed by prescriber Every 28 (Twenty-Eight) Days. 10 mL 1   • cycloSPORINE (RESTASIS) 0.05 % ophthalmic emulsion 1 drop 2 (Two) Times a Day.     • desvenlafaxine (PRISTIQ) 50 MG 24 hr tablet Take 1 tablet by mouth once daily 90 tablet 3   • levothyroxine (Synthroid) 25 MCG tablet Take 1 tablet by mouth Daily. 90 tablet 1   • losartan (COZAAR) 100 MG tablet Take 1 tablet by mouth once daily 90 tablet 0   •  "phentermine 37.5 MG capsule Take 37.5 mg by mouth Every Morning.     • Syringe/Needle, Disp, (Luer Lock Safety Syringes) 25G X 5/8\" 3 ML misc 1 each Every 30 (Thirty) Days. 50 each 0   • zolpidem (AMBIEN) 10 MG tablet Take 1 tablet by mouth At Night As Needed for Sleep. 30 tablet 2   • Evamist 1.53 MG/SPRAY transdermal spray 1 spray Daily.     • fluorouracil (EFUDEX) 5 % cream 1 application Daily As Needed.     • progesterone (PROMETRIUM) 100 MG capsule 1 capsule Daily.     • [DISCONTINUED] fluticasone (FLONASE) 50 MCG/ACT nasal spray 2 sprays into the nostril(s) as directed by provider Daily. 3 bottle 3     No current facility-administered medications on file prior to visit.        The following portions of the patient's history were reviewed and updated as appropriate: allergies, current medications, past family history, past medical history, past social history, past surgical history and problem list.    Review of Systems   Constitutional: Positive for unexpected weight loss. Negative for activity change, appetite change, fatigue, fever and unexpected weight gain.   Eyes: Negative for visual disturbance.   Respiratory: Negative for chest tightness.    Cardiovascular: Negative for chest pain.   Gastrointestinal: Positive for nausea. Negative for vomiting.   Musculoskeletal: Positive for myalgias. Negative for gait problem and joint swelling.   Allergic/Immunologic: Positive for environmental allergies. Negative for immunocompromised state.   Neurological: Positive for headache.   Psychiatric/Behavioral: Negative for dysphoric mood, depressed mood and stress. The patient is nervous/anxious.        Objective   /70 (BP Location: Left arm, Patient Position: Sitting)   Pulse 91   Temp 97.3 °F (36.3 °C) (Infrared)   Ht 164.1 cm (64.6\")   Wt 75.5 kg (166 lb 6.4 oz)   SpO2 99%   BMI 28.03 kg/m²   Physical Exam  Constitutional:       General: She is not in acute distress.     Appearance: Normal appearance. She " is well-developed. She is not diaphoretic.   HENT:      Head: Normocephalic and atraumatic.   Eyes:      Conjunctiva/sclera: Conjunctivae normal.   Cardiovascular:      Rate and Rhythm: Normal rate and regular rhythm.      Heart sounds: Normal heart sounds. No murmur. No friction rub. No gallop.    Pulmonary:      Effort: Pulmonary effort is normal. No respiratory distress.      Breath sounds: Normal breath sounds. No wheezing.   Musculoskeletal:      Right lower leg: No edema.      Left lower leg: No edema.      Comments: pedal pulses normal B   Skin:     General: Skin is warm and dry.   Neurological:      General: No focal deficit present.      Mental Status: She is alert and oriented to person, place, and time. Mental status is at baseline.      Cranial Nerves: No cranial nerve deficit.      Sensory: No sensory deficit.      Motor: No weakness.      Coordination: Coordination normal.      Gait: Gait normal.   Psychiatric:         Mood and Affect: Mood normal.         Behavior: Behavior normal.         Thought Content: Thought content normal.         Judgment: Judgment normal.           Assessment/Plan   Diagnoses and all orders for this visit:    1. Benign essential hypertension (Primary) - good control. We will recheck bmp in 3m (hyponatremia)    2. Acquired hypothyroidism- recheck in     3. Primary insomnia - ambien refilled, is helping. Pt has already signed controlled substance contract. Fantasma done today and appropriate.   -     zolpidem (AMBIEN) 10 MG tablet; Take 1 tablet by mouth At Night As Needed for Sleep.  Dispense: 30 tablet; Refill: 2    4. Daily headache- may be from restart of HRT. She is seeing her GYN this month and will discuss further w/ her. We could also consider switching pristiq. Pt does feel she needs to stay on something for anxiety. She will let me know if there is no improvement and we could also do imaging. We discussed rebound HAs too, and to try to avoid daily use of otc's    5.  Pain in both lower extremities -pedal pulses good. Not clear cause. We will recheck D w/ labs in 3m    6. Anxiety- will continue pristiq for now though pt may want to switch back to lexparo at some point      Flu vavccine - had at work

## 2021-01-06 DIAGNOSIS — F51.01 PRIMARY INSOMNIA: ICD-10-CM

## 2021-01-06 DIAGNOSIS — I10 BENIGN ESSENTIAL HYPERTENSION: ICD-10-CM

## 2021-01-06 RX ORDER — LOSARTAN POTASSIUM 100 MG/1
100 TABLET ORAL DAILY
Qty: 7 TABLET | Refills: 0 | Status: SHIPPED | OUTPATIENT
Start: 2021-01-06 | End: 2021-01-14 | Stop reason: SDUPTHER

## 2021-01-06 RX ORDER — ZOLPIDEM TARTRATE 10 MG/1
10 TABLET ORAL NIGHTLY PRN
Qty: 7 TABLET | Refills: 0 | Status: SHIPPED | OUTPATIENT
Start: 2021-01-06 | End: 2021-01-14 | Stop reason: SDUPTHER

## 2021-01-14 ENCOUNTER — LAB (OUTPATIENT)
Dept: LAB | Facility: HOSPITAL | Age: 61
End: 2021-01-14

## 2021-01-14 ENCOUNTER — OFFICE VISIT (OUTPATIENT)
Dept: INTERNAL MEDICINE | Facility: CLINIC | Age: 61
End: 2021-01-14

## 2021-01-14 VITALS
WEIGHT: 168.6 LBS | TEMPERATURE: 98.2 F | BODY MASS INDEX: 28.09 KG/M2 | OXYGEN SATURATION: 99 % | HEIGHT: 65 IN | SYSTOLIC BLOOD PRESSURE: 122 MMHG | DIASTOLIC BLOOD PRESSURE: 68 MMHG | HEART RATE: 95 BPM

## 2021-01-14 DIAGNOSIS — I10 BENIGN ESSENTIAL HYPERTENSION: Chronic | ICD-10-CM

## 2021-01-14 DIAGNOSIS — R79.89 ABNORMAL TSH: ICD-10-CM

## 2021-01-14 DIAGNOSIS — E55.9 VITAMIN D DEFICIENCY: ICD-10-CM

## 2021-01-14 DIAGNOSIS — F51.01 PRIMARY INSOMNIA: Primary | Chronic | ICD-10-CM

## 2021-01-14 DIAGNOSIS — E03.9 ACQUIRED HYPOTHYROIDISM: Chronic | ICD-10-CM

## 2021-01-14 DIAGNOSIS — F43.21 PROLONGED DEPRESSIVE ADJUSTMENT REACTION: Chronic | ICD-10-CM

## 2021-01-14 LAB
ALBUMIN SERPL-MCNC: 4.8 G/DL (ref 3.5–5.2)
ALBUMIN/GLOB SERPL: 2.2 G/DL
ALP SERPL-CCNC: 74 U/L (ref 39–117)
ALT SERPL W P-5'-P-CCNC: 19 U/L (ref 1–33)
ANION GAP SERPL CALCULATED.3IONS-SCNC: 12 MMOL/L (ref 5–15)
AST SERPL-CCNC: 24 U/L (ref 1–32)
BILIRUB SERPL-MCNC: 0.2 MG/DL (ref 0–1.2)
BUN SERPL-MCNC: 16 MG/DL (ref 6–20)
BUN/CREAT SERPL: 17 (ref 7–25)
CALCIUM SPEC-SCNC: 9.4 MG/DL (ref 8.6–10.5)
CHLORIDE SERPL-SCNC: 100 MMOL/L (ref 98–107)
CO2 SERPL-SCNC: 25 MMOL/L (ref 22–29)
CREAT SERPL-MCNC: 0.94 MG/DL (ref 0.57–1)
GFR SERPL CREATININE-BSD FRML MDRD: 61 ML/MIN/1.73
GLOBULIN UR ELPH-MCNC: 2.2 GM/DL
GLUCOSE SERPL-MCNC: 76 MG/DL (ref 65–99)
POTASSIUM SERPL-SCNC: 4.3 MMOL/L (ref 3.5–5.2)
PROT SERPL-MCNC: 7 G/DL (ref 6–8.5)
SODIUM SERPL-SCNC: 137 MMOL/L (ref 136–145)

## 2021-01-14 PROCEDURE — 84443 ASSAY THYROID STIM HORMONE: CPT | Performed by: INTERNAL MEDICINE

## 2021-01-14 PROCEDURE — 85027 COMPLETE CBC AUTOMATED: CPT | Performed by: INTERNAL MEDICINE

## 2021-01-14 PROCEDURE — 82306 VITAMIN D 25 HYDROXY: CPT | Performed by: INTERNAL MEDICINE

## 2021-01-14 PROCEDURE — 99214 OFFICE O/P EST MOD 30 MIN: CPT | Performed by: INTERNAL MEDICINE

## 2021-01-14 PROCEDURE — 80053 COMPREHEN METABOLIC PANEL: CPT | Performed by: INTERNAL MEDICINE

## 2021-01-14 PROCEDURE — 84439 ASSAY OF FREE THYROXINE: CPT | Performed by: INTERNAL MEDICINE

## 2021-01-14 RX ORDER — LEVOTHYROXINE SODIUM 0.03 MG/1
25 TABLET ORAL DAILY
Qty: 90 TABLET | Refills: 1 | Status: CANCELLED | OUTPATIENT
Start: 2021-01-14

## 2021-01-14 RX ORDER — LOSARTAN POTASSIUM 100 MG/1
100 TABLET ORAL DAILY
Qty: 90 TABLET | Refills: 1 | Status: SHIPPED | OUTPATIENT
Start: 2021-01-14 | End: 2021-07-21 | Stop reason: SDUPTHER

## 2021-01-14 RX ORDER — ZOLPIDEM TARTRATE 10 MG/1
10 TABLET ORAL NIGHTLY PRN
Qty: 90 TABLET | Refills: 0 | Status: SHIPPED | OUTPATIENT
Start: 2021-01-14 | End: 2021-04-07 | Stop reason: SDUPTHER

## 2021-01-14 NOTE — PROGRESS NOTES
"Chief Complaint  Insomnia (follow up), Hypothyroidism, Hypertension, Anxiety, and Med Refill    Subjective          Po Edmondson presents to [facility] for   History of Present Illness  Insomnia - she is using ambien 10 and this does help. Works better than the ambien CR     Hypothyroid - we did labs in 7/20 and tsh looked good in 1 range. Wt down some and she is trying to lose. BM regular.     htn - she os on losartan and HCTZ and has been under 120s/60s generally. Sodium level has been slightly low. She hasn't checked recently     Overweight - she sees weight loss clinic and Takes phentermine a few times a week and has lost a few pounds    Anxiety - she is on pristiq and doing ok w/ this. Lost her job and wasn't overly stressed about it    Leg cramps are better- she is wearing her compression hoses and trying to stay well hydrated. She is taking a D vitamin, hair/skin/nail and a centrum MVI fairly regualrly    HRT -ended up going off them again and feels better    Objective   Vital Signs:   /68 (BP Location: Left arm, Patient Position: Sitting)   Pulse 95   Temp 98.2 °F (36.8 °C) (Infrared)   Ht 164.1 cm (64.6\")   Wt 76.5 kg (168 lb 9.6 oz)   SpO2 99%   BMI 28.41 kg/m²     Physical Exam   Constitutional: oriented to person, place, and time.  well-developed and well-nourished. No distress.   HENT:   Head: Normocephalic and atraumatic.   Eyes: Conjunctivae and EOM are normal.   Cardiovascular: Normal rate, regular rhythm and normal heart sounds.  Exam reveals no gallop and no friction rub.    No murmur heard.  Pulmonary/Chest: Effort normal and breath sounds normal. No respiratory distress.   no wheezes.   Neurological:  alert and oriented to person, place, and time.   Skin: Skin is warm and dry. not diaphoretic.   Psychiatric:  normal mood and affect. behavior is normal. Judgment and thought content normal.   Result Review :                 Assessment and Plan  "   Problem List Items Addressed This Visit        Cardiac and Vasculature    Benign essential hypertension-good control.  Check labs today.  Encouraged checking blood pressure once a week at home    Relevant Medications    losartan (COZAAR) 100 MG tablet    Other Relevant Orders    CBC (No Diff)    Comprehensive Metabolic Panel       Endocrine and Metabolic    Vitamin D deficiency-patient is on vitamin D supplement.  Check levels today    Relevant Orders    Vitamin D 25 Hydroxy    Acquired hypothyroidism-check levels today    Relevant Orders    T4, Free    TSH       Mental Health    Prolonged depressive adjustment reaction-stable on Pristiq.  We will continue       Sleep    Primary insomnia - Primary-Ambien is helping.  We will continue. Pt has already signed controlled substance contract. Fantasma done today and appropriate.       Relevant Medications    zolpidem (AMBIEN) 10 MG tablet      Other Visit Diagnoses     Abnormal TSH              Follow Up   No follow-ups on file.  Patient was given instructions and counseling regarding her condition or for health maintenance advice. Please see specific information pulled into the AVS if appropriate.     DEXA-she is wondering if this will be covered.  She will check her insurance and let me know if it is and we can order  Colon is due but she declines currently.  We will try to get her to do later this year  She got 1st covid vaccine through her work

## 2021-01-15 LAB
25(OH)D3 SERPL-MCNC: 37 NG/ML (ref 30–100)
DEPRECATED RDW RBC AUTO: 36.8 FL (ref 37–54)
ERYTHROCYTE [DISTWIDTH] IN BLOOD BY AUTOMATED COUNT: 11.4 % (ref 12.3–15.4)
HCT VFR BLD AUTO: 34 % (ref 34–46.6)
HGB BLD-MCNC: 11.8 G/DL (ref 12–15.9)
MCH RBC QN AUTO: 30.7 PG (ref 26.6–33)
MCHC RBC AUTO-ENTMCNC: 34.7 G/DL (ref 31.5–35.7)
MCV RBC AUTO: 88.5 FL (ref 79–97)
PLATELET # BLD AUTO: 369 10*3/MM3 (ref 140–450)
PMV BLD AUTO: 11.1 FL (ref 6–12)
RBC # BLD AUTO: 3.84 10*6/MM3 (ref 3.77–5.28)
T4 FREE SERPL-MCNC: 1.08 NG/DL (ref 0.93–1.7)
TSH SERPL DL<=0.05 MIU/L-ACNC: 1.77 UIU/ML (ref 0.27–4.2)
WBC # BLD AUTO: 7.27 10*3/MM3 (ref 3.4–10.8)

## 2021-01-17 DIAGNOSIS — D50.8 OTHER IRON DEFICIENCY ANEMIA: Primary | ICD-10-CM

## 2021-01-17 DIAGNOSIS — R79.89 ABNORMAL TSH: ICD-10-CM

## 2021-01-17 RX ORDER — LEVOTHYROXINE SODIUM 0.03 MG/1
25 TABLET ORAL DAILY
Qty: 90 TABLET | Refills: 3 | Status: SHIPPED | OUTPATIENT
Start: 2021-01-17 | End: 2022-03-14

## 2021-03-18 DIAGNOSIS — R79.89 ABNORMAL TSH: ICD-10-CM

## 2021-03-18 RX ORDER — LEVOTHYROXINE SODIUM 0.03 MG/1
25 TABLET ORAL DAILY
Qty: 90 TABLET | Refills: 3 | OUTPATIENT
Start: 2021-03-18

## 2021-03-18 RX ORDER — LEVOTHYROXINE SODIUM 0.03 MG/1
TABLET ORAL
Qty: 90 TABLET | Refills: 0 | OUTPATIENT
Start: 2021-03-18

## 2021-04-07 ENCOUNTER — OFFICE VISIT (OUTPATIENT)
Dept: INTERNAL MEDICINE | Facility: CLINIC | Age: 61
End: 2021-04-07

## 2021-04-07 ENCOUNTER — LAB (OUTPATIENT)
Dept: LAB | Facility: HOSPITAL | Age: 61
End: 2021-04-07

## 2021-04-07 VITALS
WEIGHT: 172.6 LBS | BODY MASS INDEX: 28.76 KG/M2 | SYSTOLIC BLOOD PRESSURE: 132 MMHG | DIASTOLIC BLOOD PRESSURE: 64 MMHG | TEMPERATURE: 97.6 F | HEART RATE: 83 BPM | HEIGHT: 65 IN

## 2021-04-07 DIAGNOSIS — F51.01 PRIMARY INSOMNIA: Chronic | ICD-10-CM

## 2021-04-07 DIAGNOSIS — I10 BENIGN ESSENTIAL HYPERTENSION: Chronic | ICD-10-CM

## 2021-04-07 DIAGNOSIS — D64.9 ANEMIA, UNSPECIFIED TYPE: ICD-10-CM

## 2021-04-07 DIAGNOSIS — D64.9 ANEMIA, UNSPECIFIED TYPE: Primary | ICD-10-CM

## 2021-04-07 DIAGNOSIS — F41.1 ANXIETY STATE: Chronic | ICD-10-CM

## 2021-04-07 LAB
FERRITIN SERPL-MCNC: 206 NG/ML (ref 13–150)
FOLATE SERPL-MCNC: 13.3 NG/ML (ref 4.78–24.2)
IRON 24H UR-MRATE: 101 MCG/DL (ref 37–145)
IRON SATN MFR SERPL: 22 % (ref 20–50)
TIBC SERPL-MCNC: 451 MCG/DL (ref 298–536)
TRANSFERRIN SERPL-MCNC: 303 MG/DL (ref 200–360)
VIT B12 BLD-MCNC: 526 PG/ML (ref 211–946)

## 2021-04-07 PROCEDURE — 84466 ASSAY OF TRANSFERRIN: CPT | Performed by: INTERNAL MEDICINE

## 2021-04-07 PROCEDURE — 83540 ASSAY OF IRON: CPT | Performed by: INTERNAL MEDICINE

## 2021-04-07 PROCEDURE — 82746 ASSAY OF FOLIC ACID SERUM: CPT | Performed by: INTERNAL MEDICINE

## 2021-04-07 PROCEDURE — 99214 OFFICE O/P EST MOD 30 MIN: CPT | Performed by: INTERNAL MEDICINE

## 2021-04-07 PROCEDURE — 82728 ASSAY OF FERRITIN: CPT | Performed by: INTERNAL MEDICINE

## 2021-04-07 PROCEDURE — 82607 VITAMIN B-12: CPT | Performed by: INTERNAL MEDICINE

## 2021-04-07 RX ORDER — ZOLPIDEM TARTRATE 10 MG/1
10 TABLET ORAL NIGHTLY PRN
Qty: 90 TABLET | Refills: 2 | Status: SHIPPED | OUTPATIENT
Start: 2021-04-07 | End: 2021-06-29 | Stop reason: SDUPTHER

## 2021-04-07 RX ORDER — BACITRACIN 500 [USP'U]/G
OINTMENT OPHTHALMIC AS NEEDED
COMMUNITY
Start: 2021-03-15 | End: 2021-07-21

## 2021-04-07 RX ORDER — ESCITALOPRAM OXALATE 10 MG/1
10 TABLET ORAL DAILY
Qty: 30 TABLET | Refills: 11 | Status: SHIPPED | OUTPATIENT
Start: 2021-04-07 | End: 2021-07-21

## 2021-04-07 NOTE — PROGRESS NOTES
Answers for HPI/ROS submitted by the patient on 3/31/2021  Please describe your symptoms.: Follow up for  medication refill of Ambien  Have you had these symptoms before?: Yes  How long have you been having these symptoms?: Greater than 2 weeks  Please list any medications you are currently taking for this condition.: Ambien  Please describe any probable cause for these symptoms. : Insomnia  What is the primary reason for your visit?: Other    Chief Complaint  Insomnia (follow up), Med Refill, and Hypertension    Subjective          Po Edmondson presents to [facility]  History of Present Illness    Insomnia-Ambien 10 mg is working well.  Does need a refill today.  No side effects the next day    Anxiety/depression-she has gone back and forth between Lexapro and Pristiq but she thinks she would like to go back on Lexapro again.  She feels like it controlled her anxiety better.    Mild anemia-her hematocrit was 34 last check.  MCV was 88.  She does take a multivitamin daily and is on B12 injections monthly.  Her B12 shot is due about now.  She does eat red meat regularly and has not noticed any bleeding.  She is due for her colonoscopy this year    Hypothyroid-she is taking Synthroid regularly and TSH looked good when we checked it 3 months ago.  She has noticed that her skin is blotchy when she is cold and she does have Raynaud's as well.  Some joint achiness but nothing out of the ordinary range and no joint inflammation.  She is wondering if she needs any blood test for autoimmune diseases        Current Outpatient Medications:   •  Chlorcyclizine-Pseudoephed (STAHIST AD) 25-60 MG tablet, 1 qd- bid prn allergies, Disp: 60 tablet, Rfl: 11  •  Cholecalciferol (VITAMIN D) 50 MCG (2000 UT) tablet, Take 2,000 Units by mouth Daily., Disp: , Rfl:   •  cyanocobalamin 1000 MCG/ML injection, Inject 1 mL into the appropriate muscle as directed by prescriber Every 28 (Twenty-Eight) Days., Disp:  "10 mL, Rfl: 1  •  cycloSPORINE (RESTASIS) 0.05 % ophthalmic emulsion, 1 drop 2 (Two) Times a Day., Disp: , Rfl:   •  fluorouracil (EFUDEX) 5 % cream, 1 application Daily As Needed., Disp: , Rfl:   •  levothyroxine (Synthroid) 25 MCG tablet, Take 1 tablet by mouth Daily., Disp: 90 tablet, Rfl: 3  •  losartan (COZAAR) 100 MG tablet, Take 1 tablet by mouth Daily., Disp: 90 tablet, Rfl: 1  •  Syringe/Needle, Disp, (Luer Lock Safety Syringes) 25G X 5/8\" 3 ML misc, 1 each Every 30 (Thirty) Days., Disp: 50 each, Rfl: 0  •  zolpidem (AMBIEN) 10 MG tablet, Take 1 tablet by mouth At Night As Needed for Sleep., Disp: 90 tablet, Rfl: 2  •  bacitracin 500 UNIT/GM ophthalmic ointment, As Needed. Apply 1/2 inch strip ointment to each eye at bedtime as directed., Disp: , Rfl:   •  escitalopram (Lexapro) 10 MG tablet, Take 1 tablet by mouth Daily., Disp: 30 tablet, Rfl: 11  •  phentermine 37.5 MG capsule, Take 37.5 mg by mouth Every Morning., Disp: , Rfl:       Objective   Vital Signs:   /64 (BP Location: Right arm, Patient Position: Sitting)   Pulse 83   Temp 97.6 °F (36.4 °C) (Infrared)   Ht 164.1 cm (64.6\")   Wt 78.3 kg (172 lb 9.6 oz)   BMI 29.08 kg/m²       Physical exam  Constitutional: oriented to person, place, and time.  well-developed and well-nourished. No distress.   HENT:   Head: Normocephalic and atraumatic.   Eyes: Conjunctivae and EOM are normal.   Cardiovascular: Normal rate, regular rhythm and normal heart sounds.  Exam reveals no gallop and no friction rub.    No murmur heard.  Pulmonary/Chest: Effort normal and breath sounds normal. No respiratory distress.   no wheezes.   Neurological:  alert and oriented to person, place, and time.   Skin: Skin is warm and dry. not diaphoretic.   Psychiatric:  normal mood and affect. behavior is normal. Judgment and thought content normal.      Result Review :   The following data was reviewed by: Eleanor Diggs MD on 04/07/2021:  Common labs    Common Labsle " 7/20/20 7/20/20 1/14/21 1/14/21    1326 1326 1537 1537   Glucose 98  76    BUN 13  16    Creatinine 0.95  0.94    eGFR Non African Am 61  61    Sodium 130 (A)  137    Potassium 4.8  4.3    Chloride 94 (A)  100    Calcium 9.4  9.4    Albumin 4.90  4.80    Total Bilirubin 0.6  0.2    Alkaline Phosphatase 81  74    AST (SGOT) 19  24    ALT (SGPT) 18  19    WBC    7.27   Hemoglobin    11.8 (A)   Hematocrit    34.0   Platelets    369   Total Cholesterol  222 (A)     Triglycerides  97     HDL Cholesterol  84 (A)     LDL Cholesterol   119 (A)     (A) Abnormal value            CBC    CBC 1/14/21   WBC 7.27   RBC 3.84   Hemoglobin 11.8 (A)   Hematocrit 34.0   MCV 88.5   MCH 30.7   MCHC 34.7   RDW 11.4 (A)   Platelets 369   (A) Abnormal value            TSH    TSH 7/20/20 1/14/21   TSH 1.250 1.770           Lab Results   Component Value Date    YKUW94TQ 37.0 01/14/2021    [id number] 506 02/25/2019               Assessment and Plan    Diagnoses and all orders for this visit:    1. Anemia, unspecified type (Primary)  Comments:  Mild.  We will check iron levels, B12 and folate.  Colonoscopy is due but she does not want to do right now.  If iron levels are low, we will try to get this done more immediately  Orders:  -     Iron Profile; Future  -     Ferritin; Future  -     Vitamin B12; Future  -     Folate; Future    2. Primary insomnia  Comments:  Well-controlled with Ambien.  Refill sent in today.  Fantasma Horan appropriate  Orders:  -     zolpidem (AMBIEN) 10 MG tablet; Take 1 tablet by mouth At Night As Needed for Sleep.  Dispense: 90 tablet; Refill: 2    3. Benign essential hypertension  Comments:  Good control overall on current regimen    4. Anxiety state  Comments:  She would like to switch back to the Lexapro.  We will switch directly from Pristiq to Lexapro.  Call if any difficulty  -     escitalopram (Lexapro) 10 MG tablet; Take 1 tablet by mouth Daily.  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  Patient was given instructions and counseling regarding her condition or for health maintenance advice. Please see specific information pulled into the AVS if appropriate.

## 2021-06-29 DIAGNOSIS — F51.01 PRIMARY INSOMNIA: Chronic | ICD-10-CM

## 2021-06-29 RX ORDER — ZOLPIDEM TARTRATE 10 MG/1
10 TABLET ORAL NIGHTLY PRN
Qty: 30 TABLET | Refills: 0 | Status: SHIPPED | OUTPATIENT
Start: 2021-06-29 | End: 2021-07-21 | Stop reason: SDUPTHER

## 2021-07-21 ENCOUNTER — OFFICE VISIT (OUTPATIENT)
Dept: INTERNAL MEDICINE | Facility: CLINIC | Age: 61
End: 2021-07-21

## 2021-07-21 VITALS
HEIGHT: 65 IN | HEART RATE: 77 BPM | WEIGHT: 166.4 LBS | OXYGEN SATURATION: 97 % | DIASTOLIC BLOOD PRESSURE: 70 MMHG | SYSTOLIC BLOOD PRESSURE: 140 MMHG | BODY MASS INDEX: 27.72 KG/M2 | TEMPERATURE: 97.1 F

## 2021-07-21 DIAGNOSIS — F51.01 PRIMARY INSOMNIA: Chronic | ICD-10-CM

## 2021-07-21 DIAGNOSIS — R79.89 ELEVATED FERRITIN: ICD-10-CM

## 2021-07-21 DIAGNOSIS — E03.9 ACQUIRED HYPOTHYROIDISM: Primary | Chronic | ICD-10-CM

## 2021-07-21 DIAGNOSIS — I10 BENIGN ESSENTIAL HYPERTENSION: Chronic | ICD-10-CM

## 2021-07-21 PROCEDURE — 99214 OFFICE O/P EST MOD 30 MIN: CPT | Performed by: INTERNAL MEDICINE

## 2021-07-21 RX ORDER — LOSARTAN POTASSIUM 100 MG/1
100 TABLET ORAL DAILY
Qty: 90 TABLET | Refills: 1 | Status: SHIPPED | OUTPATIENT
Start: 2021-07-21 | End: 2022-01-20

## 2021-07-21 RX ORDER — ZOLPIDEM TARTRATE 10 MG/1
10 TABLET ORAL NIGHTLY PRN
Qty: 90 TABLET | Refills: 0 | Status: SHIPPED | OUTPATIENT
Start: 2021-07-21 | End: 2021-10-21 | Stop reason: SDUPTHER

## 2021-07-21 RX ORDER — DESVENLAFAXINE SUCCINATE 50 MG/1
50 TABLET, EXTENDED RELEASE ORAL DAILY
COMMUNITY
Start: 2021-06-21 | End: 2021-10-28

## 2021-07-21 NOTE — PROGRESS NOTES
Answers for HPI/ROS submitted by the patient on 7/19/2021  Please describe your symptoms.: Follow up for medication renewal ( Ambien. ) no problems  Have you had these symptoms before?: No  How long have you been having these symptoms?: Greater than 2 weeks  Please list any medications you are currently taking for this condition.: N/ a  Please describe any probable cause for these symptoms. : No symptoms  What is the primary reason for your visit?: Other    Chief Complaint  Insomnia (f/u, 4/2021), Hyperlipidemia, Hypertension, and Med Refill    Subjective          Po Edmondson presents to [facility]  History of Present Illness    Insomnia - on ambien 10 and this is working well.  Does not feel hung over the next day    Anxiety/depression - had tried Lexapro again but felt too tired so went back on the Pristiq and doing fine with it.    Hypothyroid - on synthroid and TSH was normal in 1/21    Mild anemia - iron studies were normal except that ferritin was elevated. b12 and FA were normal.    HTN -has noticed recently that blood pressure has been in the high 130s/80s range.  She has had more stress as her father had a fall and had a subdural hematoma and was in the ICU.  He is back home now.  She had been on HCTZ in the past but caused hyponatremia.  She feels like she follows a low-salt diet and has been more active recently doing some remodeling.    Arthralgias-she does notice joint stiffness in the mornings.  No swelling in her joints  OA runs in her family, but no history of rheumatoid arthritis      Current Outpatient Medications:   •  Chlorcyclizine-Pseudoephed (STAHIST AD) 25-60 MG tablet, 1 qd- bid prn allergies, Disp: 60 tablet, Rfl: 11  •  Cholecalciferol (VITAMIN D) 50 MCG (2000 UT) tablet, Take 2,000 Units by mouth Daily., Disp: , Rfl:   •  cyanocobalamin 1000 MCG/ML injection, Inject 1 mL into the appropriate muscle as directed by prescriber Every 28 (Twenty-Eight) Days.,  "Disp: 10 mL, Rfl: 1  •  cycloSPORINE (RESTASIS) 0.05 % ophthalmic emulsion, 1 drop 2 (Two) Times a Day., Disp: , Rfl:   •  desvenlafaxine (PRISTIQ) 50 MG 24 hr tablet, Take 50 mg by mouth Daily., Disp: , Rfl:   •  fluorouracil (EFUDEX) 5 % cream, 1 application Daily As Needed., Disp: , Rfl:   •  levothyroxine (Synthroid) 25 MCG tablet, Take 1 tablet by mouth Daily., Disp: 90 tablet, Rfl: 3  •  losartan (COZAAR) 100 MG tablet, Take 1 tablet by mouth Daily., Disp: 90 tablet, Rfl: 1  •  phentermine 37.5 MG capsule, Take 37.5 mg by mouth Every Morning., Disp: , Rfl:   •  Syringe/Needle, Disp, (Luer Lock Safety Syringes) 25G X 5/8\" 3 ML misc, 1 each Every 30 (Thirty) Days., Disp: 50 each, Rfl: 0  •  zolpidem (AMBIEN) 10 MG tablet, Take 1 tablet by mouth At Night As Needed for Sleep., Disp: 90 tablet, Rfl: 0      Objective   Vital Signs:   /70 (BP Location: Right arm, Patient Position: Sitting, Cuff Size: Adult)   Pulse 77   Temp 97.1 °F (36.2 °C) (Infrared)   Ht 164.1 cm (64.6\")   Wt 75.5 kg (166 lb 6.4 oz)   SpO2 97%   BMI 28.03 kg/m²       Physical exam  Constitutional: oriented to person, place, and time.  well-developed and well-nourished. No distress.   HENT:   Head: Normocephalic and atraumatic.   Eyes: Conjunctivae and EOM are normal.   Cardiovascular: Normal rate, regular rhythm and normal heart sounds.  Exam reveals no gallop and no friction rub.    No murmur heard.  Pulmonary/Chest: Effort normal and breath sounds normal. No respiratory distress.   no wheezes.   Neurological:  alert and oriented to person, place, and time.   Skin: Skin is warm and dry. not diaphoretic.   MS: Some osteoarthritis changes in her hands but no synovitis  Psychiatric:  normal mood and affect. behavior is normal. Judgment and thought content normal.      Result Review :   The following data was reviewed by: Eleanor Diggs MD on 07/21/2021:  CMP    CMP 1/14/21   Glucose 76   BUN 16   Creatinine 0.94   eGFR Non African Am " 61   Sodium 137   Potassium 4.3   Chloride 100   Calcium 9.4   Albumin 4.80   Total Bilirubin 0.2   Alkaline Phosphatase 74   AST (SGOT) 24   ALT (SGPT) 19           CBC    CBC 1/14/21   WBC 7.27   RBC 3.84   Hemoglobin 11.8 (A)   Hematocrit 34.0   MCV 88.5   MCH 30.7   MCHC 34.7   RDW 11.4 (A)   Platelets 369   (A) Abnormal value                TSH    TSH 1/14/21   TSH 1.770               Last Urine Toxicity    There is no flowsheet data to display.      and   Lab Results   Component Value Date    CJTW76YM 37.0 01/14/2021    XLKUTFCA66 526 04/07/2021               Assessment and Plan    Diagnoses and all orders for this visit:    1. Acquired hypothyroidism (Primary)  Comments:  We will recheck levels in 3 months    2. Primary insomnia  Comments:  Stable on Ambien.  She has signed Fantasma MELENDEZ appropriate  Orders:  -     zolpidem (AMBIEN) 10 MG tablet; Take 1 tablet by mouth At Night As Needed for Sleep.  Dispense: 90 tablet; Refill: 0    3. Benign essential hypertension  Comments:  Blood pressure slightly elevated today and at home as well.  We discussed different options of restarting HCTZ and rechecking sodium versus trying amlodipine.  She wants to continue working on healthy diet, exercise, and weight loss and she will continue to monitor.  She will let me know how readings are in next few weeks  Orders:  -     losartan (COZAAR) 100 MG tablet; Take 1 tablet by mouth Daily.  Dispense: 90 tablet; Refill: 1    4. Elevated ferritin  Comments:  We will check in 3 months along with other inflammatory labs, arthritis labs        Follow Up   Return in about 3 months (around 10/21/2021) for Next scheduled follow up.  Patient was given instructions and counseling regarding her condition or for health maintenance advice. Please see specific information pulled into the AVS if appropriate.     Prev; colon is due - she wants to do this fall-we will plan to schedule at her 3-month follow-up  She will see vascular surgery this  fall as well for varicose veins

## 2021-10-21 ENCOUNTER — LAB (OUTPATIENT)
Dept: LAB | Facility: HOSPITAL | Age: 61
End: 2021-10-21

## 2021-10-21 ENCOUNTER — OFFICE VISIT (OUTPATIENT)
Dept: INTERNAL MEDICINE | Facility: CLINIC | Age: 61
End: 2021-10-21

## 2021-10-21 VITALS
SYSTOLIC BLOOD PRESSURE: 132 MMHG | WEIGHT: 168.2 LBS | OXYGEN SATURATION: 100 % | HEART RATE: 90 BPM | DIASTOLIC BLOOD PRESSURE: 68 MMHG | BODY MASS INDEX: 28.02 KG/M2 | HEIGHT: 65 IN | TEMPERATURE: 97.8 F

## 2021-10-21 DIAGNOSIS — Z12.11 COLON CANCER SCREENING: ICD-10-CM

## 2021-10-21 DIAGNOSIS — F51.01 PRIMARY INSOMNIA: Primary | Chronic | ICD-10-CM

## 2021-10-21 DIAGNOSIS — Z23 NEED FOR INFLUENZA VACCINATION: ICD-10-CM

## 2021-10-21 DIAGNOSIS — I10 BENIGN ESSENTIAL HYPERTENSION: ICD-10-CM

## 2021-10-21 DIAGNOSIS — D51.0 PERNICIOUS ANEMIA: ICD-10-CM

## 2021-10-21 DIAGNOSIS — E03.9 ACQUIRED HYPOTHYROIDISM: ICD-10-CM

## 2021-10-21 LAB
ALBUMIN SERPL-MCNC: 5.1 G/DL (ref 3.5–5.2)
ALBUMIN/GLOB SERPL: 2 G/DL
ALP SERPL-CCNC: 99 U/L (ref 39–117)
ALT SERPL W P-5'-P-CCNC: 21 U/L (ref 1–33)
ANION GAP SERPL CALCULATED.3IONS-SCNC: 15.7 MMOL/L (ref 5–15)
AST SERPL-CCNC: 26 U/L (ref 1–32)
BILIRUB SERPL-MCNC: 0.5 MG/DL (ref 0–1.2)
BUN SERPL-MCNC: 16 MG/DL (ref 6–20)
BUN/CREAT SERPL: 16 (ref 7–25)
CALCIUM SPEC-SCNC: 10 MG/DL (ref 8.6–10.5)
CHLORIDE SERPL-SCNC: 96 MMOL/L (ref 98–107)
CHOLEST SERPL-MCNC: 241 MG/DL (ref 0–200)
CO2 SERPL-SCNC: 23.3 MMOL/L (ref 22–29)
CREAT SERPL-MCNC: 1 MG/DL (ref 0.57–1)
DEPRECATED RDW RBC AUTO: 38.7 FL (ref 37–54)
ERYTHROCYTE [DISTWIDTH] IN BLOOD BY AUTOMATED COUNT: 11.8 % (ref 12.3–15.4)
FERRITIN SERPL-MCNC: 228 NG/ML (ref 13–150)
GFR SERPL CREATININE-BSD FRML MDRD: 57 ML/MIN/1.73
GLOBULIN UR ELPH-MCNC: 2.6 GM/DL
GLUCOSE SERPL-MCNC: 101 MG/DL (ref 65–99)
HCT VFR BLD AUTO: 38.3 % (ref 34–46.6)
HDLC SERPL-MCNC: 79 MG/DL (ref 40–60)
HGB BLD-MCNC: 12.4 G/DL (ref 12–15.9)
IRON 24H UR-MRATE: 170 MCG/DL (ref 37–145)
IRON SATN MFR SERPL: 36 % (ref 20–50)
LDLC SERPL CALC-MCNC: 141 MG/DL (ref 0–100)
LDLC/HDLC SERPL: 1.74 {RATIO}
MCH RBC QN AUTO: 29.3 PG (ref 26.6–33)
MCHC RBC AUTO-ENTMCNC: 32.4 G/DL (ref 31.5–35.7)
MCV RBC AUTO: 90.5 FL (ref 79–97)
PLATELET # BLD AUTO: 362 10*3/MM3 (ref 140–450)
PMV BLD AUTO: 11.1 FL (ref 6–12)
POTASSIUM SERPL-SCNC: 4.5 MMOL/L (ref 3.5–5.2)
PROT SERPL-MCNC: 7.7 G/DL (ref 6–8.5)
RBC # BLD AUTO: 4.23 10*6/MM3 (ref 3.77–5.28)
SODIUM SERPL-SCNC: 135 MMOL/L (ref 136–145)
TIBC SERPL-MCNC: 474 MCG/DL (ref 298–536)
TRANSFERRIN SERPL-MCNC: 318 MG/DL (ref 200–360)
TRIGL SERPL-MCNC: 123 MG/DL (ref 0–150)
TSH SERPL DL<=0.05 MIU/L-ACNC: 2.98 UIU/ML (ref 0.27–4.2)
VLDLC SERPL-MCNC: 21 MG/DL (ref 5–40)
WBC # BLD AUTO: 6.83 10*3/MM3 (ref 3.4–10.8)

## 2021-10-21 PROCEDURE — 90471 IMMUNIZATION ADMIN: CPT | Performed by: INTERNAL MEDICINE

## 2021-10-21 PROCEDURE — 90686 IIV4 VACC NO PRSV 0.5 ML IM: CPT | Performed by: INTERNAL MEDICINE

## 2021-10-21 PROCEDURE — 85027 COMPLETE CBC AUTOMATED: CPT | Performed by: INTERNAL MEDICINE

## 2021-10-21 PROCEDURE — 84443 ASSAY THYROID STIM HORMONE: CPT | Performed by: INTERNAL MEDICINE

## 2021-10-21 PROCEDURE — 84466 ASSAY OF TRANSFERRIN: CPT | Performed by: INTERNAL MEDICINE

## 2021-10-21 PROCEDURE — 80061 LIPID PANEL: CPT | Performed by: INTERNAL MEDICINE

## 2021-10-21 PROCEDURE — 83540 ASSAY OF IRON: CPT | Performed by: INTERNAL MEDICINE

## 2021-10-21 PROCEDURE — 99214 OFFICE O/P EST MOD 30 MIN: CPT | Performed by: INTERNAL MEDICINE

## 2021-10-21 PROCEDURE — 80053 COMPREHEN METABOLIC PANEL: CPT | Performed by: INTERNAL MEDICINE

## 2021-10-21 PROCEDURE — 82728 ASSAY OF FERRITIN: CPT | Performed by: INTERNAL MEDICINE

## 2021-10-21 RX ORDER — ZOLPIDEM TARTRATE 10 MG/1
10 TABLET ORAL NIGHTLY PRN
Qty: 90 TABLET | Refills: 0 | Status: SHIPPED | OUTPATIENT
Start: 2021-10-21 | End: 2022-01-25 | Stop reason: SDUPTHER

## 2021-10-21 RX ORDER — CYANOCOBALAMIN 1000 UG/ML
1000 INJECTION, SOLUTION INTRAMUSCULAR; SUBCUTANEOUS
Qty: 10 ML | Refills: 1 | Status: SHIPPED | OUTPATIENT
Start: 2021-10-21 | End: 2022-12-05

## 2021-10-21 RX ORDER — BACITRACIN 500 [USP'U]/G
OINTMENT OPHTHALMIC DAILY PRN
COMMUNITY
Start: 2021-08-23 | End: 2022-04-27

## 2021-10-21 NOTE — PROGRESS NOTES
Answers for HPI/ROS submitted by the patient on 10/17/2021  Please describe your symptoms.: Follow up for medication / labs  Have you had these symptoms before?: Yes  How long have you been having these symptoms?: 1-4 days  Please list any medications you are currently taking for this condition.: Insomnia - ambien  Please describe any probable cause for these symptoms. : N/a  What is the primary reason for your visit?: Other    Chief Complaint  Insomnia (follow up) and B12 deficiency (needs syringes for B12 injections refilled)    Subjective          Po Edmondson presents to [facility]  History of Present Illness    Insomnia - on ambien 10 and this is working well.  Does not feel hung over the next day with it and does help her sleep     Anxiety/depression - om Pristiq and doing fine with it.     Hypothyroid - on synthroid and TSH was normal in 1/21.  Weight up 2 pounds     Mild anemia - iron studies were normal except that ferritin was elevated. b12 and FA were normal.not on any oral vitamins.  She had a lot of joint achiness on last visit and we were going to do some additional autoimmune labs but she says that joint pains have resolved     HTN - she has been taking losartan regularly  She is not on phentermine currently-last filled 3 months ago    B12 deficiency-needs refill on her syringes and B12    Current Outpatient Medications:   •  Chlorcyclizine-Pseudoephed (STAHIST AD) 25-60 MG tablet, 1 qd- bid prn allergies, Disp: 60 tablet, Rfl: 11  •  Cholecalciferol (VITAMIN D) 50 MCG (2000 UT) tablet, Take 2,000 Units by mouth Daily., Disp: , Rfl:   •  cyanocobalamin 1000 MCG/ML injection, Inject 1 mL into the appropriate muscle as directed by prescriber Every 28 (Twenty-Eight) Days., Disp: 10 mL, Rfl: 1  •  cycloSPORINE (RESTASIS) 0.05 % ophthalmic emulsion, 1 drop 2 (Two) Times a Day., Disp: , Rfl:   •  desvenlafaxine (PRISTIQ) 50 MG 24 hr tablet, Take 50 mg by mouth Daily., Disp: ,  "Rfl:   •  fluorouracil (EFUDEX) 5 % cream, 1 application Daily As Needed., Disp: , Rfl:   •  levothyroxine (Synthroid) 25 MCG tablet, Take 1 tablet by mouth Daily., Disp: 90 tablet, Rfl: 3  •  losartan (COZAAR) 100 MG tablet, Take 1 tablet by mouth Daily., Disp: 90 tablet, Rfl: 1  •  Syringe/Needle, Disp, (Luer Lock Safety Syringes) 25G X 5/8\" 3 ML misc, 1 each Every 30 (Thirty) Days., Disp: 50 each, Rfl: 0  •  zolpidem (AMBIEN) 10 MG tablet, Take 1 tablet by mouth At Night As Needed for Sleep., Disp: 90 tablet, Rfl: 0  •  bacitracin 500 UNIT/GM ophthalmic ointment, Daily As Needed., Disp: , Rfl:   •  phentermine 37.5 MG capsule, Take 37.5 mg by mouth Every Morning., Disp: , Rfl:       Objective   Vital Signs:   /68 (BP Location: Right arm, Patient Position: Sitting)   Pulse 90   Temp 97.8 °F (36.6 °C) (Infrared)   Ht 164.1 cm (64.6\")   Wt 76.3 kg (168 lb 3.2 oz)   SpO2 100%   BMI 28.34 kg/m²       Physical exam  Constitutional: oriented to person, place, and time.  well-developed and well-nourished. No distress.   HENT:   Head: Normocephalic and atraumatic.   Eyes: Conjunctivae and EOM are normal.   Cardiovascular: Normal rate, regular rhythm and normal heart sounds.  Exam reveals no gallop and no friction rub.    No murmur heard.  Pulmonary/Chest: Effort normal and breath sounds normal. No respiratory distress.   no wheezes.   Neurological:  alert and oriented to person, place, and time.   Skin: Skin is warm and dry. not diaphoretic.   Psychiatric:  normal mood and affect. behavior is normal. Judgment and thought content normal.      Result Review :   The following data was reviewed by: Eleanor Diggs MD on 10/21/2021:  Common labs    Common Labsle 1/14/21 1/14/21    1537 1537   Glucose 76    BUN 16    Creatinine 0.94    eGFR Non African Am 61    Sodium 137    Potassium 4.3    Chloride 100    Calcium 9.4    Albumin 4.80    Total Bilirubin 0.2    Alkaline Phosphatase 74    AST (SGOT) 24    ALT (SGPT) " "19    WBC  7.27   Hemoglobin  11.8 (A)   Hematocrit  34.0   Platelets  369   (A) Abnormal value                        Assessment and Plan    Diagnoses and all orders for this visit:    1. Primary insomnia (Primary)  Comments:  Stable on Ambien.  She has signed Fantasma MELENDEZ appropriate  Orders:  -     zolpidem (AMBIEN) 10 MG tablet; Take 1 tablet by mouth At Night As Needed for Sleep.  Dispense: 90 tablet; Refill: 0    2. Need for influenza vaccination  -     FluLaval/Fluarix/Fluzone >6 Months ([id number])    3. Pernicious anemia  -     Iron Profile; Future  -     Ferritin; Future  -     CBC (No Diff); Future  -     cyanocobalamin 1000 MCG/ML injection; Inject 1 mL into the appropriate muscle as directed by prescriber Every 28 (Twenty-Eight) Days.  Dispense: 10 mL; Refill: 1    4. Benign essential hypertension-good control on losartan  -     Comprehensive Metabolic Panel; Future  -     CBC (No Diff); Future  -     Lipid Panel; Future    5. Acquired hypothyroidism-check levels today  -     TSH; Future    6. Colon cancer screening  -     Ambulatory Referral For Screening Colonoscopy    Other orders  -     Syringe/Needle, Disp, (Luer Lock Safety Syringes) 25G X 5/8\" 3 ML misc; 1 each Every 30 (Thirty) Days.  Dispense: 50 each; Refill: 0        Follow Up   Return in about 3 months (around 1/21/2022) for Next scheduled follow up.  Patient was given instructions and counseling regarding her condition or for health maintenance advice. Please see specific information pulled into the AVS if appropriate.       "

## 2021-10-28 RX ORDER — DESVENLAFAXINE SUCCINATE 50 MG/1
TABLET, EXTENDED RELEASE ORAL
Qty: 90 TABLET | Refills: 3 | Status: SHIPPED | OUTPATIENT
Start: 2021-10-28 | End: 2022-03-07

## 2022-01-19 ENCOUNTER — TELEPHONE (OUTPATIENT)
Dept: INTERNAL MEDICINE | Facility: CLINIC | Age: 62
End: 2022-01-19

## 2022-01-19 DIAGNOSIS — I10 BENIGN ESSENTIAL HYPERTENSION: Chronic | ICD-10-CM

## 2022-01-20 RX ORDER — LOSARTAN POTASSIUM 100 MG/1
TABLET ORAL
Qty: 90 TABLET | Refills: 0 | Status: SHIPPED | OUTPATIENT
Start: 2022-01-20 | End: 2022-01-21

## 2022-01-20 NOTE — TELEPHONE ENCOUNTER
Rx Refill Note  Requested Prescriptions     Pending Prescriptions Disp Refills   • losartan (COZAAR) 100 MG tablet [Pharmacy Med Name: Losartan Potassium 100 MG Oral Tablet] 90 tablet 0     Sig: Take 1 tablet by mouth once daily      Last office visit with prescribing clinician: 10/21/2021      Next office visit with prescribing clinician: 1/25/2022     Last Fill Date: 07/21/2021  Quantity: 90  Refills: 1 April ÁNGEL Ochoa MA  01/20/22, 08:39 EST

## 2022-01-20 NOTE — TELEPHONE ENCOUNTER
Chiki from the patient's pharmacy called to let us know that the losartan is on back order (50mg and 100mg) they are requesting that an alternative be sent in for the patient as soon as possible. Thanks

## 2022-01-21 RX ORDER — VALSARTAN 160 MG/1
160 TABLET ORAL DAILY
Qty: 30 TABLET | Refills: 5 | Status: SHIPPED | OUTPATIENT
Start: 2022-01-21 | End: 2022-08-16

## 2022-01-25 ENCOUNTER — OFFICE VISIT (OUTPATIENT)
Dept: INTERNAL MEDICINE | Facility: CLINIC | Age: 62
End: 2022-01-25

## 2022-01-25 VITALS
SYSTOLIC BLOOD PRESSURE: 126 MMHG | HEIGHT: 65 IN | HEART RATE: 85 BPM | DIASTOLIC BLOOD PRESSURE: 78 MMHG | OXYGEN SATURATION: 99 % | BODY MASS INDEX: 28.72 KG/M2 | WEIGHT: 172.4 LBS | RESPIRATION RATE: 18 BRPM | TEMPERATURE: 97.3 F

## 2022-01-25 DIAGNOSIS — F51.01 PRIMARY INSOMNIA: Chronic | ICD-10-CM

## 2022-01-25 DIAGNOSIS — I10 BENIGN ESSENTIAL HYPERTENSION: Primary | Chronic | ICD-10-CM

## 2022-01-25 DIAGNOSIS — E03.9 ACQUIRED HYPOTHYROIDISM: Chronic | ICD-10-CM

## 2022-01-25 PROCEDURE — 99214 OFFICE O/P EST MOD 30 MIN: CPT | Performed by: INTERNAL MEDICINE

## 2022-01-25 RX ORDER — METHYLPREDNISOLONE 4 MG/1
TABLET ORAL
Qty: 21 TABLET | Refills: 0 | Status: SHIPPED | OUTPATIENT
Start: 2022-01-25 | End: 2022-04-27

## 2022-01-25 RX ORDER — ZOLPIDEM TARTRATE 10 MG/1
10 TABLET ORAL NIGHTLY PRN
Qty: 90 TABLET | Refills: 0 | Status: SHIPPED | OUTPATIENT
Start: 2022-01-25 | End: 2022-04-27 | Stop reason: SDUPTHER
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
## 2022-03-07 DIAGNOSIS — Z12.11 COLON CANCER SCREENING: Primary | ICD-10-CM

## 2022-03-07 RX ORDER — ESCITALOPRAM OXALATE 10 MG/1
10 TABLET ORAL DAILY
Qty: 90 TABLET | Refills: 3 | Status: SHIPPED | OUTPATIENT
Start: 2022-03-07 | End: 2022-04-27

## 2022-03-14 DIAGNOSIS — R79.89 ABNORMAL TSH: ICD-10-CM

## 2022-03-14 RX ORDER — LEVOTHYROXINE SODIUM 0.03 MG/1
TABLET ORAL
Qty: 90 TABLET | Refills: 0 | Status: SHIPPED | OUTPATIENT
Start: 2022-03-14 | End: 2022-06-22

## 2022-03-14 NOTE — TELEPHONE ENCOUNTER
----- Message from Po Edmondson sent at 3/14/2022 10:38 AM EDT -----  Regarding: Levothyroxine 25mcg  Can you please send in refill for levothyroxine? Pharmacy does not have any refills on file. I only have two tablets left. Thank you , Po Edmondson

## 2022-04-27 ENCOUNTER — LAB (OUTPATIENT)
Dept: LAB | Facility: HOSPITAL | Age: 62
End: 2022-04-27

## 2022-04-27 ENCOUNTER — OFFICE VISIT (OUTPATIENT)
Dept: INTERNAL MEDICINE | Facility: CLINIC | Age: 62
End: 2022-04-27

## 2022-04-27 VITALS
SYSTOLIC BLOOD PRESSURE: 128 MMHG | WEIGHT: 167.2 LBS | OXYGEN SATURATION: 97 % | DIASTOLIC BLOOD PRESSURE: 68 MMHG | TEMPERATURE: 97.3 F | HEIGHT: 65 IN | HEART RATE: 83 BPM | BODY MASS INDEX: 27.86 KG/M2

## 2022-04-27 DIAGNOSIS — I10 BENIGN ESSENTIAL HYPERTENSION: Primary | Chronic | ICD-10-CM

## 2022-04-27 DIAGNOSIS — R68.2 DRY MOUTH AND EYES: ICD-10-CM

## 2022-04-27 DIAGNOSIS — F51.01 PRIMARY INSOMNIA: Chronic | ICD-10-CM

## 2022-04-27 DIAGNOSIS — M25.50 ARTHRALGIA, UNSPECIFIED JOINT: ICD-10-CM

## 2022-04-27 DIAGNOSIS — I10 BENIGN ESSENTIAL HYPERTENSION: ICD-10-CM

## 2022-04-27 DIAGNOSIS — H04.123 DRY MOUTH AND EYES: ICD-10-CM

## 2022-04-27 DIAGNOSIS — E03.9 ACQUIRED HYPOTHYROIDISM: Chronic | ICD-10-CM

## 2022-04-27 PROBLEM — E55.9 VITAMIN D DEFICIENCY: Chronic | Status: ACTIVE | Noted: 2018-07-17

## 2022-04-27 LAB
ALBUMIN SERPL-MCNC: 5 G/DL (ref 3.5–5.2)
ALBUMIN/GLOB SERPL: 2.1 G/DL
ALP SERPL-CCNC: 93 U/L (ref 39–117)
ALT SERPL W P-5'-P-CCNC: 21 U/L (ref 1–33)
ANION GAP SERPL CALCULATED.3IONS-SCNC: 15 MMOL/L (ref 5–15)
AST SERPL-CCNC: 26 U/L (ref 1–32)
BASOPHILS # BLD AUTO: 0.07 10*3/MM3 (ref 0–0.2)
BASOPHILS NFR BLD AUTO: 1.3 % (ref 0–1.5)
BILIRUB SERPL-MCNC: 0.4 MG/DL (ref 0–1.2)
BUN SERPL-MCNC: 17 MG/DL (ref 6–20)
BUN/CREAT SERPL: 18.5 (ref 7–25)
CALCIUM SPEC-SCNC: 9.9 MG/DL (ref 8.6–10.5)
CHLORIDE SERPL-SCNC: 96 MMOL/L (ref 98–107)
CO2 SERPL-SCNC: 25 MMOL/L (ref 22–29)
CREAT SERPL-MCNC: 0.92 MG/DL (ref 0.57–1)
CRP SERPL-MCNC: <0.3 MG/DL (ref 0–0.5)
DEPRECATED RDW RBC AUTO: 36.6 FL (ref 37–54)
EGFRCR SERPLBLD CKD-EPI 2021: 71.9 ML/MIN/1.73
EOSINOPHIL # BLD AUTO: 0.04 10*3/MM3 (ref 0–0.4)
EOSINOPHIL NFR BLD AUTO: 0.7 % (ref 0.3–6.2)
ERYTHROCYTE [DISTWIDTH] IN BLOOD BY AUTOMATED COUNT: 11.7 % (ref 12.3–15.4)
ERYTHROCYTE [SEDIMENTATION RATE] IN BLOOD: 7 MM/HR (ref 0–30)
GLOBULIN UR ELPH-MCNC: 2.4 GM/DL
GLUCOSE SERPL-MCNC: 100 MG/DL (ref 65–99)
HCT VFR BLD AUTO: 36.1 % (ref 34–46.6)
HGB BLD-MCNC: 12.1 G/DL (ref 12–15.9)
IMM GRANULOCYTES # BLD AUTO: 0.03 10*3/MM3 (ref 0–0.05)
IMM GRANULOCYTES NFR BLD AUTO: 0.5 % (ref 0–0.5)
LYMPHOCYTES # BLD AUTO: 1.32 10*3/MM3 (ref 0.7–3.1)
LYMPHOCYTES NFR BLD AUTO: 23.8 % (ref 19.6–45.3)
MCH RBC QN AUTO: 29 PG (ref 26.6–33)
MCHC RBC AUTO-ENTMCNC: 33.5 G/DL (ref 31.5–35.7)
MCV RBC AUTO: 86.6 FL (ref 79–97)
MONOCYTES # BLD AUTO: 0.53 10*3/MM3 (ref 0.1–0.9)
MONOCYTES NFR BLD AUTO: 9.6 % (ref 5–12)
NEUTROPHILS NFR BLD AUTO: 3.55 10*3/MM3 (ref 1.7–7)
NEUTROPHILS NFR BLD AUTO: 64.1 % (ref 42.7–76)
NRBC BLD AUTO-RTO: 0 /100 WBC (ref 0–0.2)
PLATELET # BLD AUTO: 338 10*3/MM3 (ref 140–450)
PMV BLD AUTO: 11.1 FL (ref 6–12)
POTASSIUM SERPL-SCNC: 4.9 MMOL/L (ref 3.5–5.2)
PROT SERPL-MCNC: 7.4 G/DL (ref 6–8.5)
RBC # BLD AUTO: 4.17 10*6/MM3 (ref 3.77–5.28)
SODIUM SERPL-SCNC: 136 MMOL/L (ref 136–145)
TSH SERPL DL<=0.05 MIU/L-ACNC: 2.04 UIU/ML (ref 0.27–4.2)
WBC NRBC COR # BLD: 5.54 10*3/MM3 (ref 3.4–10.8)

## 2022-04-27 PROCEDURE — 99214 OFFICE O/P EST MOD 30 MIN: CPT | Performed by: INTERNAL MEDICINE

## 2022-04-27 PROCEDURE — 86235 NUCLEAR ANTIGEN ANTIBODY: CPT | Performed by: INTERNAL MEDICINE

## 2022-04-27 PROCEDURE — 86140 C-REACTIVE PROTEIN: CPT | Performed by: INTERNAL MEDICINE

## 2022-04-27 PROCEDURE — 80050 GENERAL HEALTH PANEL: CPT | Performed by: INTERNAL MEDICINE

## 2022-04-27 PROCEDURE — 85652 RBC SED RATE AUTOMATED: CPT | Performed by: INTERNAL MEDICINE

## 2022-04-27 RX ORDER — ZOLPIDEM TARTRATE 10 MG/1
10 TABLET ORAL NIGHTLY PRN
Qty: 90 TABLET | Refills: 0 | Status: SHIPPED | OUTPATIENT
Start: 2022-04-27 | End: 2022-07-25 | Stop reason: SDUPTHER

## 2022-04-27 RX ORDER — DESVENLAFAXINE SUCCINATE 50 MG/1
50 TABLET, EXTENDED RELEASE ORAL DAILY
COMMUNITY
End: 2023-02-15 | Stop reason: ALTCHOICE

## 2022-04-27 RX ORDER — SODIUM PICOSULFATE, MAGNESIUM OXIDE, AND ANHYDROUS CITRIC ACID 10; 3.5; 12 MG/160ML; G/160ML; G/160ML
LIQUID ORAL
COMMUNITY
Start: 2022-04-12 | End: 2022-08-17

## 2022-04-27 NOTE — PROGRESS NOTES
Answers for HPI/ROS submitted by the patient on 4/20/2022  Please describe your symptoms.: Routine follow up for medication ( ambien)  Have you had these symptoms before?: Yes  How long have you been having these symptoms?: Greater than 2 weeks  Please list any medications you are currently taking for this condition.: Insomia - Ambien  Please describe any probable cause for these symptoms. : N/a  What is the primary reason for your visit?: Other    Chief Complaint  Insomnia (F/u), Hypothyroidism, and Hypertension    Subjective          Po Edmondson presents to [facility]  History of Present Illness    Insomnia- she is on Ambien 10 mg.  This has been working well and she does need a refill    Hypothyroid-patient on Synthroid 25 mcg, last TSH was 2.9 done 6 months ago. Wt down a few pounds.  Bowel movements have been regular.  Energy level seems normal.      Hypertension-she is on valsartan 160.  She also takes phentermine from weight loss center. She has lost weight since last visit.      She is back on proistiq, lexarpo made her energy low    She has used biotin/hair skin nails vitamin a few times recently    Joint aches-she does feel like she has a lot of achiness though she also feels like she is very active and may be just normal aches and pains.  No joint swelling.  She does have dry mouth and dry eyes and uses Restasis.  Her ferritin level was elevated previously when we were evaluating anemia.    Current Outpatient Medications:   •  Chlorcyclizine-Pseudoephed (STAHIST AD) 25-60 MG tablet, 1 qd- bid prn allergies, Disp: 60 tablet, Rfl: 11  •  Cholecalciferol (VITAMIN D) 50 MCG (2000 UT) tablet, Take 2,000 Units by mouth Daily., Disp: , Rfl:   •  Clenpiq 10-3.5-12 MG-GM -GM/160ML solution, Colonoscopy in May, Disp: , Rfl:   •  cyanocobalamin 1000 MCG/ML injection, Inject 1 mL into the appropriate muscle as directed by prescriber Every 28 (Twenty-Eight) Days., Disp: 10 mL, Rfl: 1  •  " cycloSPORINE (RESTASIS) 0.05 % ophthalmic emulsion, 1 drop 2 (Two) Times a Day., Disp: , Rfl:   •  desvenlafaxine (PRISTIQ) 50 MG 24 hr tablet, 50 mg Daily., Disp: , Rfl:   •  fluorouracil (EFUDEX) 5 % cream, 1 application Daily As Needed., Disp: , Rfl:   •  levothyroxine (SYNTHROID, LEVOTHROID) 25 MCG tablet, Take 1 tablet by mouth once daily, Disp: 90 tablet, Rfl: 0  •  phentermine 37.5 MG capsule, Take 37.5 mg by mouth As Needed., Disp: , Rfl:   •  Syringe/Needle, Disp, (Luer Lock Safety Syringes) 25G X 5/8\" 3 ML misc, 1 each Every 30 (Thirty) Days., Disp: 50 each, Rfl: 0  •  valsartan (Diovan) 160 MG tablet, Take 1 tablet by mouth Daily., Disp: 30 tablet, Rfl: 5  •  zolpidem (AMBIEN) 10 MG tablet, Take 1 tablet by mouth At Night As Needed for Sleep., Disp: 90 tablet, Rfl: 0      Objective   Vital Signs:   /68 (BP Location: Left arm, Patient Position: Sitting)   Pulse 83   Temp 97.3 °F (36.3 °C) (Infrared)   Ht 164.1 cm (64.6\")   Wt 75.8 kg (167 lb 3.2 oz)   SpO2 97%   BMI 28.17 kg/m²       Physical exam  Constitutional: oriented to person, place, and time.  well-developed and well-nourished. No distress.   HENT:   Head: Normocephalic and atraumatic.   Eyes: Conjunctivae and EOM are normal.   Cardiovascular: Normal rate, regular rhythm and normal heart sounds.  Exam reveals no gallop and no friction rub.    No murmur heard.  Pulmonary/Chest: Effort normal and breath sounds normal. No respiratory distress.   no wheezes.   Neurological:  alert and oriented to person, place, and time.   Skin: Skin is warm and dry. not diaphoretic.   Psychiatric:  normal mood and affect. behavior is normal. Judgment and thought content normal.      Result Review :   The following data was reviewed by: Eleanor Diggs MD on 04/27/2022:  CMP    CMP 10/21/21   Glucose 101 (A)   BUN 16   Creatinine 1.00   eGFR Non African Am 57 (A)   Sodium 135 (A)   Potassium 4.5   Chloride 96 (A)   Calcium 10.0   Albumin 5.10   Total " Bilirubin 0.5   Alkaline Phosphatase 99   AST (SGOT) 26   ALT (SGPT) 21   (A) Abnormal value       Comments are available for some flowsheets but are not being displayed.           CBC    CBC 10/21/21   WBC 6.83   RBC 4.23   Hemoglobin 12.4   Hematocrit 38.3   MCV 90.5   MCH 29.3   MCHC 32.4   RDW 11.8 (A)   Platelets 362   (A) Abnormal value            TSH    TSH 10/21/21   TSH 2.980           Lab Results   Component Value Date    DLAB55BW 37.0 01/14/2021    [id number] 526 04/07/2021               Assessment and Plan    Diagnoses and all orders for this visit:    1. Benign essential hypertension (Primary)-blood pressure looks good today.  Encouraged her to check at home as well  -     Comprehensive Metabolic Panel; Future  -     CBC & Differential; Future    2. Acquired hypothyroidism-check levels today.  She has used biotin so this might throw it off some  -     TSH; Future    3. Primary insomnia  Comments:  Stable on Ambien.  She has signed Fantasma MELENDEZ appropriate  Orders:  -     zolpidem (AMBIEN) 10 MG tablet; Take 1 tablet by mouth At Night As Needed for Sleep.  Dispense: 90 tablet; Refill: 0    4. Arthralgia, unspecified joint-elevated ferritin in the past.  We will go ahead and check inflammatory markers and Sjogren antibodies  -     Sedimentation Rate; Future  -     C-reactive Protein; Future  -     Sjogren's Antibody, Anti-SS-A / -SS-B; Future    5. Dry mouth and eyes  -     Sedimentation Rate; Future  -     C-reactive Protein; Future  -     Sjogren's Antibody, Anti-SS-A / -SS-B; Future        Follow Up   Return in about 3 months (around 7/27/2022).  Patient was given instructions and counseling regarding her condition or for health maintenance advice. Please see specific information pulled into the AVS if appropriate.     She has colon[place] 5[place]11 [place] [facility] [place]

## 2022-04-28 LAB
ENA SS-A AB SER-ACNC: <0.2 AI (ref 0–0.9)
ENA SS-B AB SER-ACNC: <0.2 AI (ref 0–0.9)

## 2022-06-22 DIAGNOSIS — R79.89 ABNORMAL TSH: ICD-10-CM

## 2022-06-22 RX ORDER — LEVOTHYROXINE SODIUM 25 UG/1
TABLET ORAL
Qty: 90 TABLET | Refills: 2 | Status: SHIPPED | OUTPATIENT
Start: 2022-06-22 | End: 2023-02-15 | Stop reason: SDUPTHER

## 2022-06-27 ENCOUNTER — E-VISIT (OUTPATIENT)
Dept: FAMILY MEDICINE CLINIC | Facility: TELEHEALTH | Age: 62
End: 2022-06-27

## 2022-06-27 PROCEDURE — BRIGHTMDVISIT: Performed by: NURSE PRACTITIONER

## 2022-06-27 NOTE — EXTERNAL PATIENT INSTRUCTIONS
Diagnosis   Urinary tract infection (UTI)   My name is Courtney Kessler. I'm a healthcare provider at [facility]. After reviewing your interview, I see you have a urinary tract infection (UTI).   Medications   [facility] [phone] [facility] [id number] [phone]     Prescription   Phenazopyridine (200mg): Take 1 tablet by mouth three times a day as needed for 2 days for pain or discomfort associated with your condition.    I've given you a prescription dose of phenazopyridine. If it's more affordable or convenient, you may use the equivalent amount of non-prescription phenazopyridine. For example, instead of taking one 200 mg phenazopyridine tablet, you may take two 95 mg phenazopyridine tablets.   About your diagnosis   A UTI is an infection of one or more parts of the urinary tract, most commonly the bladder.   Most UTIs are caused by bacteria (usually E. coli) that travel up the urethra and into the bladder. I see that you have some common signs and symptoms of a UTI:    Pain or burning while urinating    Sudden urge to urinate    Symptoms that feel a lot like past UTIs    Cloudy urine    Strange or strong smelling urine    Symptoms that began shortly after sexual intercourse   Fortunately, most UTIs aren't serious, and they're easily treated with antibiotics.   What to expect   If you follow this treatment plan, you should start to feel better within 1 to 2 days.   When to seek care   Call us at [phone]   with any sudden or unexpected symptoms.    Symptoms that don't improve or get worse in the next 48 hours    Fever that goes above 101F    Shaking or chills    Nausea or vomiting    Severe flank pain (pain in your back or side)   Other treatment    Rest and drink plenty of water    Urinate frequently and when you first feel the urge    Place a heating pad on your back or stomach to help relieve some of the discomfort   Prevention    Drink a lot of liquids to  help flush bacteria from your system. Water is best. Try for six to eight, 8-ounce glasses a day on a regular basis.    Urinate often and when you first feel the urge. Bacteria can grow when urine stays in the bladder too long. Urinate after sex to flush away bacteria.    After using the toilet, always wipe from front to back. This step is most important after a bowel movement. Wiping from front to back prevents bacteria normally found in stool from entering the urinary tract.   Your provider   Your diagnosis was provided by Courtney Kessler, a member of your trusted care team at [facility].   If you have any questions, call us at [phone]  .

## 2022-06-27 NOTE — E-VISIT TREATED
Chief Complaint: Bladder infection (UTI)   Patient introduction   Patient is 61-year-old female.   Patient reports dysuria and urinary urgency for 1-3 days.   Urine is described as cloudy with a strong or pungent odor.   Patient-submitted comments Patient writes: Urine was pinkish yesterday and I was spotting when wiping. THat has stopped but the pain is still there. .   Patient did not request an excuse note.   General presentation   Denies fever. Denies nausea and vomiting.   Denies stomach/pelvic pain.   Denies back pain.   Denies flank pain. Reports difficulty starting, stopping, or delaying urination.   Denies use of OTC acetaminophen, ibuprofen, or phenazopyridine for current symptoms.   Reports previous history of UTI. Current symptoms feel exactly the same as previous UTIs. Reports receiving treatment for UTI 0 times in last year.   Uncertain whether treated past UTIs with any of the following: amoxicillin-clavulanate, cefdinir, cefuroxime, cephalexin, ciprofloxacin, fosfomycin, nitrofurantoin monohydrate, TMP/SMX, or trimethoprim.   No known history of yeast infections as a result of taking antibiotics for past UTIs.   Reports sexual activity in the past week. Denies current diaphragm use. Denies unprotected sexual intercourse with a new partner in the last 2 weeks. Denies exposure to sexually transmitted infections in the last month.   Patient denies recent history of cycling, motorcycling, or riding horseback. Patient denies recent use of tight-fitting pants/undergarments. Patient denies recent changes in soaps or cosmetics.   Patient denies current treatment for diabetes mellitus.   Denies the following red flags:    Recent hospitalizations or nursing home care (last 3 months)    History of renal failure    Recent history of pyelonephritis (within the last year)    History of nephrolithiasis    Recent history of urologic instrumentation    Anatomic abnormalities of the urinary tract    Abnormal  vaginal discharge    Visible vaginal sores    Pain with sexual intercourse    Abnormal vaginal bleeding or spotting   Pregnancy/menstrual status/breastfeeding:   Patient is postmenopausal.   Current medications   Denies taking other medications or supplements.   Medication allergies   None.   Medication contraindication review   Denies currently taking ACE inhibitors and ARBs.   Denies history of anaphylactic reaction to beta-lactams; folate deficiency; G6PD deficiency; arrhythmia; coronary artery disease; megaloblastic anemia; mononucleosis; myasthenia gravis; cholestatic jaundice; oliguria/anuria; and TMP/SMX-associated thrombocytopenia.   No known history of amoxicillin-clavulanate-associated cholestatic jaundice or nitrofurantoin-associated cholestatic jaundice.   Past medical history   Immune conditions: Denies immunocompromising conditions. Denies history of cancer.   Assessment   Uncomplicated acute UTI.   This is the likely diagnosis based on patient's reported symptoms and history, including:    Previous history of UTI    Current symptoms are exactly the same as previous UTIs    Urine described as cloudy with a strong odor    Recent history of delaying urination   Plan   Medications:    phenazopyridine 200 mg tablet RX 200mg 1 tab PO tid PRN 2d for pain or discomfort associated with your condition. Amount is 6 tab.   The patient's prescription will be sent to:   [facility] [phone] [place] [address]   Phone: [phone]     Fax: [phone]   Education:    Condition and causes    Prevention    Treatment and self-care    When to call provider   Follow-up:   Patient to follow up as needed for progression or lack of improvement in symptoms within 3d.   ----------   Electronically signed by ALFONSO Tay on 2022-06-27 at 16:56PM   ----------   Patient Interview Transcript:   Knowing about your anatomy is important for diagnosing and treating UTIs. The gender we have on file  for you is female, but we realize that this might not tell the whole story. Would you like to tell us more about your anatomy?    No   Not selected:    Yes   Which of these symptoms do you have? Select all that apply.    Pain or burning while urinating    Sudden urge to urinate and it's hard to hold the urine in   Not selected:    Frequent urination   How long have you had these symptoms? Select one.    1 to 3 days   Not selected:    Less than 24 hours    4 to 6 days    7 to 10 days    More than 10 days   Since your current symptoms started, has it been difficult to start, stop, or delay urination? Select one.    Yes   Not selected:    No   What color is your urine? Select one.    Cloudy   Not selected:    Clear    Yellow    Pink or red   Does your urine smell strange (like ammonia) or stronger than usual? Select one.    Yes   Not selected:    No   Do you also have any of these symptoms? Select all that apply.    No   Not selected:    Fever    Nausea    Vomiting    Pain, pressure, or discomfort in the lower abdomen    Back pain   Do you have any flank pain? The flank is the side of the body between the ribs and the hips.    No   Not selected:    Yes, in my left flank    Yes, in my right flank    Yes, in both my left and right flanks   Do you have any of these vaginal symptoms? Select all that apply.    No   Not selected:    Abnormal vaginal itching    Unscheduled or abnormal vaginal bleeding or spotting    Pain during sex    Visible sores on the vagina    Abnormal vaginal discharge   In the past 2 weeks, have you had a medical device or instrument placed in your urinary tract? Examples include catheters, stents, and nephrostomy tubes. Select one.    No   Not selected:    Yes   Have you recently been hospitalized or been a resident of a nursing home or other long-term care facility? This doesn't include emergency room (ER) visits. Select one.    No   Not selected:    Yes, within the last 2 weeks    Yes, within the  last 3 months   Have you ever had severe problems with your kidneys, such as kidney failure? Select one.    No   Not selected:    Yes   Have you ever had a kidney infection (pyelonephritis)? Select one.    Yes, over a year ago   Not selected:    Yes, within the last year    No   Have you ever had kidney stones? Select one.    No   Not selected:    Yes, within the last year    Yes, over a year ago   Have you ever been diagnosed with any of these? Select all that apply.    No   Not selected:    Urinary reflux    Bladder diverticula    Single (or horseshoe) kidney    Duplicated urethra   Have you recently spent a lot of time cycling, riding a motorcycle, or horseback riding? Select one.    No   Not selected:    Yes   Have you recently held your urine for a long time after you felt the urge to go? Select one.    Yes   Not selected:    No   Have you recently avoided eating or drinking so you wouldn't have the urge to urinate as often? Select one.    No   Not selected:    Yes   Have you recently worn any tight-fitting underwear, tights, leggings, pants, or jeans? Select one.    No   Not selected:    Yes   Have you recently used any new soaps, lotions, or cosmetics on your genital area? Select one.    No   Not selected:    Yes   Do you use a diaphragm? Select one.    No   Not selected:    Yes   Have you gone through menopause? Select one.    Yes   Not selected:    No    I'm going through it now   Have you had sexual intercourse in the past week? Recent sexual intercourse is a risk factor for urinary tract infections. Select one.    Yes   Not selected:    No   Have you been exposed to a sexually transmitted infection (STI or STD) in the last month? Examples include chlamydia, gonorrhea, trichomoniasis, and herpes. Select one.    No, not that I know of   Not selected:    Yes   Have you had unprotected sexual intercourse with a new partner in the last 2 weeks? Select one.    No   Not selected:    Yes   Have you traveled to  any of these countries within the last 3 months? Recent travel to these countries may affect which medication we recommend for your symptoms. Select all that apply.    None of these   Not selected:    [place]    [place]    [place]    [place]   Have you taken any of these medications for your current symptoms? Select any that may apply.    No   Not selected:    Acetaminophen (Tylenol)    Ibuprofen (Advil, Motrin)    Phenazopyridine (Azo, Baridium, Pyridium, Uricalm, Uristat)   Have you ever had a urinary tract infection (UTI)? A UTI is often called a bladder infection or acute cystitis. Select one.    Yes   Not selected:    No, not that I know of   How much do your current symptoms feel like past UTIs? Select one.    Exactly the same   Not selected:    Mostly the same    Somewhat the same    Totally different   In the past year, how many times have you taken antibiotics for a UTI? Select one.    0   Not selected:    1 to 3    4 or more   Which of these antibiotics have you taken for UTIs in the past? Select all that apply.    I'm not sure   Not selected:    Amoxicillin-clavulanate (Augmentin)    Cefdinir    Cefuroxime    Cephalexin (Keflex)    Ciprofloxacin (Cipro)    Fosfomycin (Monurol)    Nitrofurantoin (Macrobid, Macrodantin)    Sulfamethoxazole/trimethoprim, also known as TMP/SMX (Bactrim, Bactrim DS)    Trimethoprim (Primsol)    Other (specify)   Have you ever developed a yeast infection as a result of taking antibiotics? Select one.    No, not that I know of   Not selected:    Yes   UTIs may be more serious when other factors are present. Let's address those now. Are you being treated for type 1 or type 2 diabetes? Select one.    No   Not selected:    Yes   Do you have any of these conditions that can affect the immune system? Scroll to see all options. Select all that apply.    None of these   Not selected:    History of bone marrow transplant    Chronic kidney disease    Chronic liver disease (including  cirrhosis)    HIV/AIDS    Inflammatory bowel disease (Crohn's disease or ulcerative colitis)    Lupus    Moderate to severe plaque psoriasis    Multiple sclerosis    Rheumatoid arthritis    Sickle cell anemia    Alpha or beta thalassemia    History of solid organ transplant (kidney, liver, or heart)    History of spleen removal    An autoimmune disorder not listed here    A condition requiring treatment with long-term use of oral steroids (such as prednisone, prednisolone, or dexamethasone)   Have you ever been diagnosed with cancer? Select one.    No   Not selected:    Yes, I have cancer now    Yes, but I'm in remission   These last few questions will help us create the right treatment plan for you. Are you being treated for any of these conditions? Select all that apply.    No   Not selected:    Yrn-Danlos syndrome    Folate deficiency    G6PD deficiency    High blood pressure    History of aortic aneurysm or dissection    Marfan syndrome    Megaloblastic anemia    Mono (mononucleosis)    Myasthenia gravis    Oliguria or anuria    Peripheral vascular disease   Have you ever had jaundice as a result of taking amoxicillin-clavulanate (Augmentin) or nitrofurantoin (Macrobid)? Select all that apply.    No   Not selected:    Yes, from amoxicillin-clavulanate (Augmentin)    Yes, from nitrofurantoin (Macrobid, Macrodantin)   Are you taking any of these medications? Select all that apply.    No   Not selected:    An ACE inhibitor such as lisinopril, enalapril, captopril, or benazepril    An angiotensin II receptor blocker (ARB) such as candesartan, irbesartan, losartan, or valsartan   Are you taking any other medications or supplements? On the next screen, you need to list all vitamins, supplements, non-prescription medications (such as aspirin or Aleve), and prescription medications that you're taking. Select one.    No   Not selected:    Yes    Yes, but I'm not sure what they are   Have you ever had an allergic or  bad reaction to any medication? Select one.    No   Not selected:    Yes   Do you need a doctor's note? A doctor's note confirms that you received care today and states when you can return to school or work. It does not contain information about your diagnosis or treatment plan. Your provider will make the final decision on whether to give you a doctor's note. Doctor's notes CANNOT be backdated. Select one.    No   Not selected:    Today only (1 day)   Is there anything else you'd like to tell us about your symptoms?    Urine was pinkish yesterday and I was spotting when wiping. THat has stopped but the pain is still there.   ----------   Medical history   Medical history data does not currently exist for this patient.

## 2022-07-25 DIAGNOSIS — F51.01 PRIMARY INSOMNIA: Chronic | ICD-10-CM

## 2022-07-25 RX ORDER — ZOLPIDEM TARTRATE 10 MG/1
10 TABLET ORAL NIGHTLY PRN
Qty: 30 TABLET | Refills: 0 | Status: SHIPPED | OUTPATIENT
Start: 2022-07-25 | End: 2022-08-17 | Stop reason: SDUPTHER

## 2022-08-16 RX ORDER — VALSARTAN 160 MG/1
TABLET ORAL
Qty: 30 TABLET | Refills: 0 | Status: SHIPPED | OUTPATIENT
Start: 2022-08-16 | End: 2022-08-17 | Stop reason: SDUPTHER

## 2022-08-17 ENCOUNTER — OFFICE VISIT (OUTPATIENT)
Dept: INTERNAL MEDICINE | Facility: CLINIC | Age: 62
End: 2022-08-17

## 2022-08-17 VITALS
TEMPERATURE: 97.3 F | HEART RATE: 79 BPM | OXYGEN SATURATION: 100 % | WEIGHT: 171 LBS | SYSTOLIC BLOOD PRESSURE: 120 MMHG | DIASTOLIC BLOOD PRESSURE: 76 MMHG | HEIGHT: 65 IN | BODY MASS INDEX: 28.49 KG/M2

## 2022-08-17 DIAGNOSIS — N39.0 URINARY TRACT INFECTION WITH HEMATURIA, SITE UNSPECIFIED: ICD-10-CM

## 2022-08-17 DIAGNOSIS — I10 BENIGN ESSENTIAL HYPERTENSION: Primary | Chronic | ICD-10-CM

## 2022-08-17 DIAGNOSIS — H65.23 BILATERAL CHRONIC SEROUS OTITIS MEDIA: ICD-10-CM

## 2022-08-17 DIAGNOSIS — F51.01 PRIMARY INSOMNIA: Chronic | ICD-10-CM

## 2022-08-17 DIAGNOSIS — R31.9 URINARY TRACT INFECTION WITH HEMATURIA, SITE UNSPECIFIED: ICD-10-CM

## 2022-08-17 LAB
BILIRUB BLD-MCNC: NEGATIVE MG/DL
CLARITY, POC: ABNORMAL
COLOR UR: YELLOW
EXPIRATION DATE: ABNORMAL
GLUCOSE UR STRIP-MCNC: NEGATIVE MG/DL
KETONES UR QL: NEGATIVE
LEUKOCYTE EST, POC: ABNORMAL
Lab: ABNORMAL
NITRITE UR-MCNC: POSITIVE MG/ML
PH UR: 6.5 [PH] (ref 5–8)
PROT UR STRIP-MCNC: NEGATIVE MG/DL
RBC # UR STRIP: ABNORMAL /UL
SP GR UR: 1.01 (ref 1–1.03)
UROBILINOGEN UR QL: NORMAL

## 2022-08-17 PROCEDURE — 99214 OFFICE O/P EST MOD 30 MIN: CPT | Performed by: INTERNAL MEDICINE

## 2022-08-17 PROCEDURE — 81003 URINALYSIS AUTO W/O SCOPE: CPT | Performed by: INTERNAL MEDICINE

## 2022-08-17 RX ORDER — VALSARTAN 160 MG/1
160 TABLET ORAL DAILY
Qty: 90 TABLET | Refills: 1 | Status: SHIPPED | OUTPATIENT
Start: 2022-08-17 | End: 2023-02-15 | Stop reason: SDUPTHER

## 2022-08-17 RX ORDER — SULFAMETHOXAZOLE AND TRIMETHOPRIM 800; 160 MG/1; MG/1
1 TABLET ORAL 2 TIMES DAILY
Qty: 10 TABLET | Refills: 0 | Status: SHIPPED | OUTPATIENT
Start: 2022-08-17 | End: 2022-11-18

## 2022-08-17 RX ORDER — ZOLPIDEM TARTRATE 10 MG/1
10 TABLET ORAL NIGHTLY PRN
Qty: 90 TABLET | Refills: 0 | Status: SHIPPED | OUTPATIENT
Start: 2022-08-17 | End: 2022-11-18 | Stop reason: SDUPTHER

## 2022-08-17 RX ORDER — FLUTICASONE PROPIONATE 50 MCG
2 SPRAY, SUSPENSION (ML) NASAL DAILY
Qty: 16 G | Refills: 1 | Status: SHIPPED | OUTPATIENT
Start: 2022-08-17 | End: 2023-02-15

## 2022-08-17 NOTE — PROGRESS NOTES
Answers for HPI/ROS submitted by the patient on 8/15/2022  Please describe your symptoms.: Insomnia  Have you had these symptoms before?: Yes  How long have you been having these symptoms?: Greater than 2 weeks  Please list any medications you are currently taking for this condition.: Ambien  Please describe any probable cause for these symptoms. : N/a  What is the primary reason for your visit?: Other    Chief Complaint  Hypertension, Hypothyroidism, Ear Fullness (Ears fill stopped up.), and Urinary Tract Infection (Has discomfort when going to the bathroom.)    Subjective          Po Edmondson presents to [facility]  History of Present Illness    Insomnia- she is on Ambien 10 mg.  This has been working well and she does need a refill     Hypothyroid-patient on Synthroid 25 mcg, last TSH was 2.0 done 4 months ago.      Hypertension-she is on valsartan 160.  has not checked recently but generally is good when it is checked    Anxiety/depression - She is back on pristiq, lexarpo made her energy low.  She did lose her father earlier this year so has been a difficult time but managing     UTI symptoms - started about 6 weeks ago w/ some dysuria; no frequency; no urgency. Some LBP.She noticed pink tinged urine 1-2 times initially as well. Was given pyridium in June and helped a little but not completely. Has not been on any antibiotics yet    Ears feel full over last few weeks since she had COVID 3 weeks ago - had cough, allergy symptoms. Left ear can hurt at times as well.  Mostly feels like she has recovered from COVID other than the ears and fatigue    Varicose veins- Dr Cochran will be doing her vein sugery next month on both legs    Current Outpatient Medications:   •  Chlorcyclizine-Pseudoephed (STAHIST AD) 25-60 MG tablet, 1 qd- bid prn allergies, Disp: 60 tablet, Rfl: 11  •  Cholecalciferol (VITAMIN D) 50 MCG (2000 UT) tablet, Take 2,000 Units by mouth Daily., Disp: , Rfl:   •   "cyanocobalamin 1000 MCG/ML injection, Inject 1 mL into the appropriate muscle as directed by prescriber Every 28 (Twenty-Eight) Days., Disp: 10 mL, Rfl: 1  •  cycloSPORINE (RESTASIS) 0.05 % ophthalmic emulsion, 1 drop 2 (Two) Times a Day., Disp: , Rfl:   •  desvenlafaxine (PRISTIQ) 50 MG 24 hr tablet, 50 mg Daily., Disp: , Rfl:   •  Euthyrox 25 MCG tablet, Take 1 tablet by mouth once daily, Disp: 90 tablet, Rfl: 2  •  fluorouracil (EFUDEX) 5 % cream, 1 application Daily As Needed., Disp: , Rfl:   •  Syringe/Needle, Disp, (Luer Lock Safety Syringes) 25G X 5/8\" 3 ML misc, 1 each Every 30 (Thirty) Days., Disp: 50 each, Rfl: 0  •  valsartan (DIOVAN) 160 MG tablet, Take 1 tablet by mouth Daily., Disp: 90 tablet, Rfl: 1  •  zolpidem (AMBIEN) 10 MG tablet, Take 1 tablet by mouth At Night As Needed for Sleep., Disp: 90 tablet, Rfl: 0  •  fluticasone (Flonase) 50 MCG/ACT nasal spray, 2 sprays into the nostril(s) as directed by provider Daily., Disp: 16 g, Rfl: 1  •  phentermine 37.5 MG capsule, Take 37.5 mg by mouth As Needed., Disp: , Rfl:   •  sulfamethoxazole-trimethoprim (Bactrim DS) 800-160 MG per tablet, Take 1 tablet by mouth 2 (Two) Times a Day., Disp: 10 tablet, Rfl: 0      Objective   Vital Signs:   /76 (BP Location: Right arm, Patient Position: Sitting)   Pulse 79   Temp 97.3 °F (36.3 °C) (Infrared)   Ht 164.1 cm (64.6\")   Wt 77.6 kg (171 lb)   SpO2 100%   BMI 28.81 kg/m²       Physical exam  Constitutional: oriented to person, place, and time.  well-developed and well-nourished. No distress.   HENT:   Head: Normocephalic and atraumatic.   Eyes: Conjunctivae and EOM are normal.   TM: dull B  Cardiovascular: Normal rate, regular rhythm and normal heart sounds.  Exam reveals no gallop and no friction rub.    No murmur heard.  Pulmonary/Chest: Effort normal and breath sounds normal. No respiratory distress.   no wheezes.   Neurological:  alert and oriented to person, place, and time.   Skin: Skin is warm " and dry. not diaphoretic.   Back: no CVAT  Psychiatric:  normal mood and affect. behavior is normal. Judgment and thought content normal.      Result Review :   The following data was reviewed by: Eleanor Diggs MD on 08/17/2022:  Common labs    Common Labsle 10/21/21 10/21/21 10/21/21 4/27/22 4/27/22    1038 1038 1038 1207 1207   Glucose   101 (A)  100 (A)   BUN   16  17   Creatinine   1.00  0.92   eGFR Non African Am   57 (A)     Sodium   135 (A)  136   Potassium   4.5  4.9   Chloride   96 (A)  96 (A)   Calcium   10.0  9.9   Albumin   5.10  5.00   Total Bilirubin   0.5  0.4   Alkaline Phosphatase   99  93   AST (SGOT)   26  26   ALT (SGPT)   21  21   WBC 6.83   5.54    Hemoglobin 12.4   12.1    Hematocrit 38.3   36.1    Platelets 362   338    Total Cholesterol  241 (A)      Triglycerides  123      HDL Cholesterol  79 (A)      LDL Cholesterol   141 (A)      (A) Abnormal value       Comments are available for some flowsheets but are not being displayed.           Lab Results   Component Value Date    ZJUN86HH 37.0 01/14/2021    [id number] 526 04/07/2021               Assessment and Plan    Diagnoses and all orders for this visit:    1. Benign essential hypertension (Primary)-good control  -     valsartan (DIOVAN) 160 MG tablet; Take 1 tablet by mouth Daily.  Dispense: 90 tablet; Refill: 1    2. Primary insomnia-stable on Ambien.  She has signed controlled substance contract.  Fantasma appropriate.  -     zolpidem (AMBIEN) 10 MG tablet; Take 1 tablet by mouth At Night As Needed for Sleep.  Dispense: 90 tablet; Refill: 0    3. Urinary tract infection with hematuria, site unspecified-we will treat with back from.  Call if no better.  Push fluids  -     POC Urinalysis Dipstick, Automated  -     sulfamethoxazole-trimethoprim (Bactrim DS) 800-160 MG per tablet; Take 1 tablet by mouth 2 (Two) Times a Day.  Dispense: 10 tablet; Refill: 0    4.  Serous otitis B    -     fluticasone (Flonase) 50 MCG/ACT nasal spray; 2 sprays  into the nostril(s) as directed by provider Daily.  Dispense: 16 g; Refill: 1        Follow Up   Return in about 3 months (around 11/17/2022).  Patient was given instructions and counseling regarding her condition or for health maintenance advice. Please see specific information pulled into the AVS if appropriate.

## 2022-11-18 ENCOUNTER — LAB (OUTPATIENT)
Dept: LAB | Facility: HOSPITAL | Age: 62
End: 2022-11-18

## 2022-11-18 ENCOUNTER — OFFICE VISIT (OUTPATIENT)
Dept: INTERNAL MEDICINE | Facility: CLINIC | Age: 62
End: 2022-11-18

## 2022-11-18 VITALS
RESPIRATION RATE: 20 BRPM | OXYGEN SATURATION: 98 % | BODY MASS INDEX: 28.16 KG/M2 | WEIGHT: 169 LBS | DIASTOLIC BLOOD PRESSURE: 80 MMHG | TEMPERATURE: 97.3 F | SYSTOLIC BLOOD PRESSURE: 146 MMHG | HEART RATE: 79 BPM | HEIGHT: 65 IN

## 2022-11-18 DIAGNOSIS — E03.9 ACQUIRED HYPOTHYROIDISM: Primary | Chronic | ICD-10-CM

## 2022-11-18 DIAGNOSIS — I10 BENIGN ESSENTIAL HYPERTENSION: Chronic | ICD-10-CM

## 2022-11-18 DIAGNOSIS — Z23 NEED FOR INFLUENZA VACCINATION: ICD-10-CM

## 2022-11-18 DIAGNOSIS — E03.9 ACQUIRED HYPOTHYROIDISM: Chronic | ICD-10-CM

## 2022-11-18 DIAGNOSIS — F51.01 PRIMARY INSOMNIA: Chronic | ICD-10-CM

## 2022-11-18 PROCEDURE — 99214 OFFICE O/P EST MOD 30 MIN: CPT | Performed by: INTERNAL MEDICINE

## 2022-11-18 PROCEDURE — 80050 GENERAL HEALTH PANEL: CPT | Performed by: INTERNAL MEDICINE

## 2022-11-18 PROCEDURE — 90471 IMMUNIZATION ADMIN: CPT | Performed by: INTERNAL MEDICINE

## 2022-11-18 PROCEDURE — 90686 IIV4 VACC NO PRSV 0.5 ML IM: CPT | Performed by: INTERNAL MEDICINE

## 2022-11-18 RX ORDER — ZOLPIDEM TARTRATE 10 MG/1
10 TABLET ORAL NIGHTLY PRN
Qty: 90 TABLET | Refills: 0 | Status: SHIPPED | OUTPATIENT
Start: 2022-11-18 | End: 2023-02-15

## 2022-11-18 NOTE — PROGRESS NOTES
Answers for HPI/ROS submitted by the patient on 11/13/2022  Please describe your symptoms.: Follow up appt.  Have you had these symptoms before?: Yes  How long have you been having these symptoms?: Greater than 2 weeks  What is the primary reason for your visit?: Other    Chief Complaint  Insomnia (3 month f/u), Anxiety, Depression, Hypertension, and Hypothyroidism    Subjective          Po Edmondson presents to [facility]  History of Present Illness    Insomnia- she is on Ambien 10 mg.  This helps some though sleep not as good at times.   she does need a refill     Hypothyroid-patient on Synthroid 25 mcg, last TSH was 2.0 done 7 months ago.  Weight is stable     Hypertension-she is on valsartan 160.  she does check at home and usually 120s/70s     Anxiety/depression - She is on pristiq and has helped. Has had a rough year w/ her father's passing, but feels like she is managing     Varicose veins- Dr Cochran did surgery last month on both legs and she is doing well      Current Outpatient Medications:   •  Chlorcyclizine-Pseudoephed (STAHIST AD) 25-60 MG tablet, 1 qd- bid prn allergies, Disp: 60 tablet, Rfl: 11  •  Cholecalciferol (VITAMIN D) 50 MCG (2000 UT) tablet, Take 2,000 Units by mouth Daily., Disp: , Rfl:   •  cyanocobalamin 1000 MCG/ML injection, Inject 1 mL into the appropriate muscle as directed by prescriber Every 28 (Twenty-Eight) Days., Disp: 10 mL, Rfl: 1  •  cycloSPORINE (RESTASIS) 0.05 % ophthalmic emulsion, 1 drop 2 (Two) Times a Day., Disp: , Rfl:   •  desvenlafaxine (PRISTIQ) 50 MG 24 hr tablet, 50 mg Daily., Disp: , Rfl:   •  Euthyrox 25 MCG tablet, Take 1 tablet by mouth once daily, Disp: 90 tablet, Rfl: 2  •  fluorouracil (EFUDEX) 5 % cream, 1 application Daily As Needed., Disp: , Rfl:   •  fluticasone (Flonase) 50 MCG/ACT nasal spray, 2 sprays into the nostril(s) as directed by provider Daily., Disp: 16 g, Rfl: 1  •  phentermine 37.5 MG capsule, Take 37.5 mg by  "mouth As Needed., Disp: , Rfl:   •  Syringe/Needle, Disp, (Luer Lock Safety Syringes) 25G X 5/8\" 3 ML misc, 1 each Every 30 (Thirty) Days., Disp: 50 each, Rfl: 0  •  valsartan (DIOVAN) 160 MG tablet, Take 1 tablet by mouth Daily., Disp: 90 tablet, Rfl: 1  •  zolpidem (AMBIEN) 10 MG tablet, Take 1 tablet by mouth At Night As Needed for Sleep., Disp: 90 tablet, Rfl: 0      Objective   Vital Signs:   /80 (Cuff Size: Adult)   Pulse 79   Temp 97.3 °F (36.3 °C) (Infrared)   Resp 20   Ht 164.1 cm (64.6\")   Wt 76.7 kg (169 lb)   SpO2 98%   BMI 28.47 kg/m²       Physical exam  Constitutional: oriented to person, place, and time.  well-developed and well-nourished. No distress.   HENT:   Head: Normocephalic and atraumatic.   Eyes: Conjunctivae and EOM are normal.   Cardiovascular: Normal rate, regular rhythm and normal heart sounds.  Exam reveals no gallop and no friction rub.    No murmur heard.  Pulmonary/Chest: Effort normal and breath sounds normal. No respiratory distress.   no wheezes.   Neurological:  alert and oriented to person, place, and time.   Skin: Skin is warm and dry. not diaphoretic.   Psychiatric:  normal mood and affect. behavior is normal. Judgment and thought content normal.    Recheck 140/80    Result Review :   The following data was reviewed by: Eleanor Diggs MD on 11/18/2022:  Common labs    Common Labs 4/27/22 4/27/22    1207 1207   Glucose  100 (A)   BUN  17   Creatinine  0.92   Sodium  136   Potassium  4.9   Chloride  96 (A)   Calcium  9.9   Albumin  5.00   Total Bilirubin  0.4   Alkaline Phosphatase  93   AST (SGOT)  26   ALT (SGPT)  21   WBC 5.54    Hemoglobin 12.1    Hematocrit 36.1    Platelets 338    (A) Abnormal value            Last Urine Toxicity    There is no flowsheet data to display.                 Assessment and Plan    Diagnoses and all orders for this visit:    1. Acquired hypothyroidism (Primary)- check today  -     TSH; Future    2. Benign essential hypertension- a " little  High today - she will start monitoring more at home and let me know if it stays high  -     Comprehensive Metabolic Panel; Future  -     TSH; Future  -     CBC (No Diff); Future    3. Primary insomnia- stable on ambien. Pt has already signed controlled substance contract. Fantasma done today and appropriate.   -     zolpidem (AMBIEN) 10 MG tablet; Take 1 tablet by mouth At Night As Needed for Sleep.  Dispense: 90 tablet; Refill: 0    4. Need for influenza vaccination  -     FluLaval/Fluzone >6 mos ([id number])        Follow Up   Return in about 3 months (around 2/18/2023) for Next scheduled follow up.  Patient was given instructions and counseling regarding her condition or for health maintenance advice. Please see specific information pulled into the AVS if appropriate.

## 2022-11-19 LAB
ALBUMIN SERPL-MCNC: 4.7 G/DL (ref 3.5–5.2)
ALBUMIN/GLOB SERPL: 1.8 G/DL
ALP SERPL-CCNC: 94 U/L (ref 39–117)
ALT SERPL W P-5'-P-CCNC: 16 U/L (ref 1–33)
ANION GAP SERPL CALCULATED.3IONS-SCNC: 11.9 MMOL/L (ref 5–15)
AST SERPL-CCNC: 20 U/L (ref 1–32)
BILIRUB SERPL-MCNC: 0.3 MG/DL (ref 0–1.2)
BUN SERPL-MCNC: 19 MG/DL (ref 8–23)
BUN/CREAT SERPL: 19.4 (ref 7–25)
CALCIUM SPEC-SCNC: 9.6 MG/DL (ref 8.6–10.5)
CHLORIDE SERPL-SCNC: 97 MMOL/L (ref 98–107)
CO2 SERPL-SCNC: 29.1 MMOL/L (ref 22–29)
CREAT SERPL-MCNC: 0.98 MG/DL (ref 0.57–1)
DEPRECATED RDW RBC AUTO: 36.1 FL (ref 37–54)
EGFRCR SERPLBLD CKD-EPI 2021: 66.2 ML/MIN/1.73
ERYTHROCYTE [DISTWIDTH] IN BLOOD BY AUTOMATED COUNT: 11.4 % (ref 12.3–15.4)
GLOBULIN UR ELPH-MCNC: 2.6 GM/DL
GLUCOSE SERPL-MCNC: 103 MG/DL (ref 65–99)
HCT VFR BLD AUTO: 35.8 % (ref 34–46.6)
HGB BLD-MCNC: 11.8 G/DL (ref 12–15.9)
MCH RBC QN AUTO: 29.1 PG (ref 26.6–33)
MCHC RBC AUTO-ENTMCNC: 33 G/DL (ref 31.5–35.7)
MCV RBC AUTO: 88.4 FL (ref 79–97)
PLATELET # BLD AUTO: 310 10*3/MM3 (ref 140–450)
PMV BLD AUTO: 11 FL (ref 6–12)
POTASSIUM SERPL-SCNC: 4.6 MMOL/L (ref 3.5–5.2)
PROT SERPL-MCNC: 7.3 G/DL (ref 6–8.5)
RBC # BLD AUTO: 4.05 10*6/MM3 (ref 3.77–5.28)
SODIUM SERPL-SCNC: 138 MMOL/L (ref 136–145)
TSH SERPL DL<=0.05 MIU/L-ACNC: 2.77 UIU/ML (ref 0.27–4.2)
WBC NRBC COR # BLD: 6.09 10*3/MM3 (ref 3.4–10.8)

## 2022-12-05 DIAGNOSIS — D51.0 PERNICIOUS ANEMIA: ICD-10-CM

## 2022-12-05 RX ORDER — CYANOCOBALAMIN 1000 UG/ML
INJECTION, SOLUTION INTRAMUSCULAR; SUBCUTANEOUS
Qty: 3 ML | Refills: 3 | Status: SHIPPED | OUTPATIENT
Start: 2022-12-05

## 2022-12-05 NOTE — TELEPHONE ENCOUNTER
Rx Refill Note  Requested Prescriptions     Pending Prescriptions Disp Refills   • cyanocobalamin 1000 MCG/ML injection [Pharmacy Med Name: Cyanocobalamin 1000 MCG/ML Injection Solution] 3 mL 0     Sig: INJECT 1 ML INTRAMUSCULARLY IN THE APPROPRIATE MUSCLE AS DIRECTED BY PRESCRIBER EVERY 28 DAYS      Last filled: 10/21/2022  Last office visit with prescribing clinician: 11/18/2022      Next office visit with prescribing clinician: 2/15/2023     April ÁNGEL Ochoa MA  12/05/22, 12:47 EST     Component   Ref Range & Units 1 yr ago 3 yr ago 4 yr ago 5 yr ago   Vitamin B-12   211 - 946 pg/mL 526  506 R  377 R  634 R

## 2023-02-15 ENCOUNTER — OFFICE VISIT (OUTPATIENT)
Dept: INTERNAL MEDICINE | Facility: CLINIC | Age: 63
End: 2023-02-15
Payer: COMMERCIAL

## 2023-02-15 VITALS
BODY MASS INDEX: 28.82 KG/M2 | WEIGHT: 173 LBS | DIASTOLIC BLOOD PRESSURE: 82 MMHG | OXYGEN SATURATION: 100 % | HEART RATE: 69 BPM | HEIGHT: 65 IN | SYSTOLIC BLOOD PRESSURE: 136 MMHG | TEMPERATURE: 97.3 F

## 2023-02-15 DIAGNOSIS — F51.01 PRIMARY INSOMNIA: Primary | Chronic | ICD-10-CM

## 2023-02-15 DIAGNOSIS — F41.1 ANXIETY STATE: ICD-10-CM

## 2023-02-15 DIAGNOSIS — I10 BENIGN ESSENTIAL HYPERTENSION: Chronic | ICD-10-CM

## 2023-02-15 DIAGNOSIS — D51.0 PERNICIOUS ANEMIA: ICD-10-CM

## 2023-02-15 DIAGNOSIS — E03.9 ACQUIRED HYPOTHYROIDISM: Chronic | ICD-10-CM

## 2023-02-15 PROCEDURE — 99214 OFFICE O/P EST MOD 30 MIN: CPT | Performed by: INTERNAL MEDICINE

## 2023-02-15 RX ORDER — ZOLPIDEM TARTRATE 10 MG/1
10 TABLET ORAL NIGHTLY PRN
Qty: 90 TABLET | Refills: 0 | Status: CANCELLED | OUTPATIENT
Start: 2023-02-15

## 2023-02-15 RX ORDER — ESCITALOPRAM OXALATE 10 MG/1
10 TABLET ORAL DAILY
COMMUNITY
End: 2023-02-15 | Stop reason: SDUPTHER

## 2023-02-15 RX ORDER — LEVOTHYROXINE SODIUM 0.03 MG/1
25 TABLET ORAL DAILY
Qty: 90 TABLET | Refills: 1 | Status: SHIPPED | OUTPATIENT
Start: 2023-02-15

## 2023-02-15 RX ORDER — ESZOPICLONE 1 MG/1
TABLET, FILM COATED ORAL
Qty: 90 TABLET | Refills: 0 | Status: SHIPPED | OUTPATIENT
Start: 2023-02-15

## 2023-02-15 RX ORDER — VALSARTAN 160 MG/1
160 TABLET ORAL DAILY
Qty: 90 TABLET | Refills: 1 | Status: SHIPPED | OUTPATIENT
Start: 2023-02-15

## 2023-02-15 RX ORDER — ESCITALOPRAM OXALATE 10 MG/1
10 TABLET ORAL DAILY
Qty: 90 TABLET | Refills: 1 | Status: SHIPPED | OUTPATIENT
Start: 2023-02-15 | End: 2023-03-22

## 2023-02-15 NOTE — PROGRESS NOTES
"Answers for HPI/ROS submitted by the patient on 2/8/2023  Please describe your symptoms.: Follow up for medication  Have you had these symptoms before?: Yes  How long have you been having these symptoms?: Greater than 2 weeks  Please list any medications you are currently taking for this condition.: Ambien 10mg 1 tablet at night  Please describe any probable cause for these symptoms. : Insomia  What is the primary reason for your visit?: Other    Chief Complaint  Insomnia, Hypertension, Hypothyroidism, Anxiety, and Depression (She is weaning off the pristiq and wanting to go back on the lexapro.  She has been taking lexapro for about a week now.)    Christine Edmondson presents to [facility]  History of Present Illness   The patient presents today for a follow up on insomnia, hypertension, hypothyroidism and depression and anxiety.     Anxiety/depression  The patient inquires about trying Lexapro instead of Pristiq. She reports having a difficulty time since her last visit, her mother started grieving the loss of the patient's father over the Christmas holidays.  Her father had  passed away over the summer of 2022 but her mom had more trouble over the holidays. She notes trying to be supportive of her mother, however, she states the negativity was worsening her anxiety.  She weaned off Pristiq about 10 days ago. She has been taking Lexapro for 10 days.  She feels like she is doing a little bit better and would like to stick with the Lexapro for now.  Historically she has gone back and forth with Lexapro and Pristiq    Insomnia  She has not been sleeping well despite taking the Ambien 10 mg nightly. She notes the situation with her mother worsened her insomnia. She notes her \"mind will not let her sleep\".  We had tried Ambien CR in the past but the CR caused a \"hang over\" feeling the next day. She has tried Lunesta several years ago, she discontinued due to insurance " "coverage.  She would like to try Lunesta again.  She does not drink caffeine. She started meditating to help with the insomnia. She tries to avoid electronics at night. The patient has a dog, however, she notes her dog does not wake her up throughout the night. 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     Hypertension  The patient notes only checking her blood pressure once or twice at home. She reports systolic readings of 120s to low 130s. Her systolic pressure today was 136. Her diastolic pressure ranges from 70s-80s. Her pulse has improved. The patient notes she has recently started exercising again.She takes valsartan.     B12 deficiency- she needs a refill on her syringes     Hypothyroid-she is taking her Synthroid regularly        Current Outpatient Medications:   •  Chlorcyclizine-Pseudoephed (STAHIST AD) 25-60 MG tablet, 1 qd- bid prn allergies, Disp: 60 tablet, Rfl: 11  •  Cholecalciferol (VITAMIN D) 50 MCG (2000 UT) tablet, Take 2,000 Units by mouth Daily., Disp: , Rfl:   •  cyanocobalamin 1000 MCG/ML injection, INJECT 1 ML INTRAMUSCULARLY IN THE APPROPRIATE MUSCLE AS DIRECTED BY PRESCRIBER EVERY 28 DAYS, Disp: 3 mL, Rfl: 3  •  cycloSPORINE (RESTASIS) 0.05 % ophthalmic emulsion, 1 drop 2 (Two) Times a Day., Disp: , Rfl:   •  escitalopram (LEXAPRO) 10 MG tablet, Take 1 tablet by mouth Daily., Disp: 90 tablet, Rfl: 1  •  fluorouracil (EFUDEX) 5 % cream, 1 application Daily As Needed., Disp: , Rfl:   •  levothyroxine (Euthyrox) 25 MCG tablet, Take 1 tablet by mouth Daily., Disp: 90 tablet, Rfl: 1  •  Syringe/Needle, Disp, (Luer Lock Safety Syringes) 25G X 5/8\" 3 ML misc, 1 each Every 30 (Thirty) Days., Disp: 50 each, Rfl: 0  •  valsartan (DIOVAN) 160 MG tablet, Take 1 tablet by mouth Daily., Disp: 90 tablet, Rfl: 1  •  eszopiclone (Lunesta) 1 MG tablet, 1 tablet qhs, can increase if needed up to 3 tablets " "qhs, Disp: 90 tablet, Rfl: 0   The following portions of the patient's history were reviewed and updated as appropriate: allergies, current medications, past family history, past medical history, past social history, past surgical history and problem list.     Objective   Vital Signs:   /82 (BP Location: Left arm, Patient Position: Sitting)   Pulse 69   Temp 97.3 °F (36.3 °C) (Infrared)   Ht 164.1 cm (64.6\")   Wt 78.5 kg (173 lb)   SpO2 100%   BMI 29.15 kg/m²       Physical exam  Constitutional: oriented to person, place, and time.  well-developed and well-nourished. No distress.   HENT:   Head: Normocephalic and atraumatic.   Eyes: Conjunctivae and EOM are normal.   Cardiovascular: Normal rate, regular rhythm and normal heart sounds.  Exam reveals no gallop and no friction rub.    No murmur heard.  Pulmonary/Chest: Effort normal and breath sounds normal. No respiratory distress.   no wheezes.   Neurological:  alert and oriented to person, place, and time.   Skin: Skin is warm and dry. not diaphoretic.   Psychiatric:  normal mood and affect. behavior is normal. Judgment and thought content normal.      Result Review :   The following data was reviewed by: Eleanor Diggs MD on 02/15/2023:  CMP    CMP 4/27/22 11/18/22   Glucose 100 (A) 103 (A)   BUN 17 19   Creatinine 0.92 0.98   eGFR 71.9 66.2   Sodium 136 138   Potassium 4.9 4.6   Chloride 96 (A) 97 (A)   Calcium 9.9 9.6   Total Protein 7.4 7.3   Albumin 5.00 4.70   Globulin 2.4 2.6   Total Bilirubin 0.4 0.3   Alkaline Phosphatase 93 94   AST (SGOT) 26 20   ALT (SGPT) 21 16   Albumin/Globulin Ratio 2.1 1.8   BUN/Creatinine Ratio 18.5 19.4   Anion Gap 15.0 11.9   (A) Abnormal value       Comments are available for some flowsheets but are not being displayed.           CBC    CBC 4/27/22 11/18/22   WBC 5.54 6.09   RBC 4.17 4.05   Hemoglobin 12.1 11.8 (A)   Hematocrit 36.1 35.8   MCV 86.6 88.4   MCH 29.0 29.1   MCHC 33.5 33.0   RDW 11.7 (A) 11.4 (A) " "  Platelets 338 310   (A) Abnormal value                TSH    TSH 4/27/22 11/18/22   TSH 2.040 2.770           Lab Results   Component Value Date    [id number] 37.0 01/14/2021    [id number] 526 04/07/2021               Assessment and Plan    Diagnoses and all orders for this visit:    1. Primary insomnia (Primary)  Assessment & Plan:  We will try Lunesta instead of Ambien. We will start with 1 mg, but she can increase if needed up to 3 mg at night. She will let me know in the next few weeks how she is doing with Lunesta and if she does like it better than Ambien, we can send in the prescription for the 3 mg if she needs 3 tablets at night. She has signed controlled substance contract and LAZARO is appropriate.    Orders:  -     eszopiclone (Lunesta) 1 MG tablet; 1 tablet qhs, can increase if needed up to 3 tablets qhs  Dispense: 90 tablet; Refill: 0    2. Acquired hypothyroidism  Assessment & Plan:  We will recheck TSH in 3 months at her next appointment.     Orders:  -     levothyroxine (Euthyrox) 25 MCG tablet; Take 1 tablet by mouth Daily.  Dispense: 90 tablet; Refill: 1    3. Benign essential hypertension  Assessment & Plan:  Blood pressures at home overall look good. She will start trying to exercise regularly and we will continue the valsartan for now. Recheck blood work next visit.    Orders:  -     valsartan (DIOVAN) 160 MG tablet; Take 1 tablet by mouth Daily.  Dispense: 90 tablet; Refill: 1    4. Anxiety state  Assessment & Plan:  She has switched over to Lexapro from the Pristiq and would like to continue on Lexapro.  Through the years, she has gone back and forth between Lexapro and Pristiq.     Orders:  -     escitalopram (LEXAPRO) 10 MG tablet; Take 1 tablet by mouth Daily.  Dispense: 90 tablet; Refill: 1    5. Pernicious anemia  Assessment & Plan:  She needs a refill on her syringes and we will send these in today.    Orders:  -     Syringe/Needle, Disp, (Luer Lock Safety Syringes) 25G X 5/8\" 3 ML " misc; 1 each Every 30 (Thirty) Days.  Dispense: 50 each; Refill: 0      Follow Up   Return in about 3 months (around 5/15/2023).  Patient was given instructions and counseling regarding her condition or for health maintenance advice. Please see specific information pulled into the AVS if appropriate.       Transcribed from ambient dictation for Eleanor Diggs MD by Bel Rea.  02/15/23   16:25 EST    Patient or patient representative verbalized consent to the visit recording.  I have personally performed the services described in this document as transcribed by the above individual, and it is both accurate and complete.

## 2023-02-15 NOTE — ASSESSMENT & PLAN NOTE
Blood pressures at home overall look good. She will start trying to exercise regularly and we will continue the valsartan for now. Recheck blood work next visit.

## 2023-02-15 NOTE — ASSESSMENT & PLAN NOTE
We will try Lunesta instead of Ambien. We will start with 1 mg, but she can increase if needed up to 3 mg at night. She will let me know in the next few weeks how she is doing with Lunesta and if she does like it better than Ambien, we can send in the prescription for the 3 mg if she needs 3 at night. She has signed controlled substance contract and LAZARO is appropriate.

## 2023-02-15 NOTE — ASSESSMENT & PLAN NOTE
She has switched over to Lexapro from the Pristiq and would like to continue on Lexapro through the years. She has gone back and forth between Lexapro and Pristiq.

## 2023-02-16 ENCOUNTER — TELEPHONE (OUTPATIENT)
Dept: INTERNAL MEDICINE | Facility: CLINIC | Age: 63
End: 2023-02-16
Payer: COMMERCIAL

## 2023-02-16 DIAGNOSIS — F51.01 PRIMARY INSOMNIA: Chronic | ICD-10-CM

## 2023-02-16 RX ORDER — ZOLPIDEM TARTRATE 10 MG/1
10 TABLET ORAL NIGHTLY PRN
Qty: 30 TABLET | Refills: 0 | Status: SHIPPED | OUTPATIENT
Start: 2023-02-16 | End: 2023-03-06

## 2023-02-16 NOTE — TELEPHONE ENCOUNTER
Caller: Po Edmondson    Relationship: Self    Best call back number:     Requested Prescriptions:   [facility]     Pharmacy where request should be sent: [id number] - [facility] - [place], [place] - [address] AT [facility] - [phone]  - [phone] FX     Additional details provided by patient: PATIENT ADVISED THAT eszopiclone (Lunesta) 1 MG tablet IS ON BACK ORDER AND SHE WOULD LIKE TO GO BACK ON HER AMBIEN; PATIENT IS OUT OF MEDICATION    Does the patient have less than a 3 day supply:  [x] Yes  [] No      Amber Fink Rep   02/16/23 12:28 EST

## 2023-02-17 ENCOUNTER — PRIOR AUTHORIZATION (OUTPATIENT)
Dept: INTERNAL MEDICINE | Facility: CLINIC | Age: 63
End: 2023-02-17
Payer: COMMERCIAL

## 2023-02-17 NOTE — TELEPHONE ENCOUNTER
Prior authorization has been imitated for lunesta.  It did not go through so I will need to call [employer] at [phone].

## 2023-03-06 RX ORDER — ZOLPIDEM TARTRATE 12.5 MG/1
12.5 TABLET, FILM COATED, EXTENDED RELEASE ORAL NIGHTLY PRN
Qty: 30 TABLET | Refills: 2 | Status: SHIPPED | OUTPATIENT
Start: 2023-03-06

## 2023-03-22 RX ORDER — DESVENLAFAXINE SUCCINATE 50 MG/1
50 TABLET, EXTENDED RELEASE ORAL DAILY
Qty: 90 TABLET | Refills: 3 | Status: SHIPPED | OUTPATIENT
Start: 2023-03-22

## 2023-05-18 ENCOUNTER — LAB (OUTPATIENT)
Dept: INTERNAL MEDICINE | Facility: CLINIC | Age: 63
End: 2023-05-18
Payer: COMMERCIAL

## 2023-05-18 ENCOUNTER — OFFICE VISIT (OUTPATIENT)
Dept: INTERNAL MEDICINE | Facility: CLINIC | Age: 63
End: 2023-05-18
Payer: COMMERCIAL

## 2023-05-18 VITALS
DIASTOLIC BLOOD PRESSURE: 80 MMHG | RESPIRATION RATE: 18 BRPM | BODY MASS INDEX: 27.72 KG/M2 | OXYGEN SATURATION: 100 % | SYSTOLIC BLOOD PRESSURE: 144 MMHG | HEART RATE: 68 BPM | WEIGHT: 166.4 LBS | HEIGHT: 65 IN

## 2023-05-18 DIAGNOSIS — E03.9 ACQUIRED HYPOTHYROIDISM: Chronic | ICD-10-CM

## 2023-05-18 DIAGNOSIS — N39.0 URINARY TRACT INFECTION WITHOUT HEMATURIA, SITE UNSPECIFIED: ICD-10-CM

## 2023-05-18 DIAGNOSIS — F51.01 PRIMARY INSOMNIA: Chronic | ICD-10-CM

## 2023-05-18 DIAGNOSIS — I10 BENIGN ESSENTIAL HYPERTENSION: Chronic | ICD-10-CM

## 2023-05-18 DIAGNOSIS — I10 BENIGN ESSENTIAL HYPERTENSION: Primary | Chronic | ICD-10-CM

## 2023-05-18 LAB
BILIRUB BLD-MCNC: NEGATIVE MG/DL
CLARITY, POC: CLEAR
COLOR UR: YELLOW
DEPRECATED RDW RBC AUTO: 36.1 FL (ref 37–54)
ERYTHROCYTE [DISTWIDTH] IN BLOOD BY AUTOMATED COUNT: 11.4 % (ref 12.3–15.4)
EXPIRATION DATE: NORMAL
GLUCOSE UR STRIP-MCNC: NEGATIVE MG/DL
HCT VFR BLD AUTO: 36.9 % (ref 34–46.6)
HGB BLD-MCNC: 12.4 G/DL (ref 12–15.9)
KETONES UR QL: NEGATIVE
LEUKOCYTE EST, POC: NEGATIVE
Lab: NORMAL
MCH RBC QN AUTO: 29.5 PG (ref 26.6–33)
MCHC RBC AUTO-ENTMCNC: 33.6 G/DL (ref 31.5–35.7)
MCV RBC AUTO: 87.9 FL (ref 79–97)
NITRITE UR-MCNC: NEGATIVE MG/ML
PH UR: 6 [PH] (ref 5–8)
PLATELET # BLD AUTO: 328 10*3/MM3 (ref 140–450)
PMV BLD AUTO: 11.2 FL (ref 6–12)
PROT UR STRIP-MCNC: NEGATIVE MG/DL
RBC # BLD AUTO: 4.2 10*6/MM3 (ref 3.77–5.28)
RBC # UR STRIP: NEGATIVE /UL
SP GR UR: 1.01 (ref 1–1.03)
UROBILINOGEN UR QL: NORMAL
WBC NRBC COR # BLD: 4.93 10*3/MM3 (ref 3.4–10.8)

## 2023-05-18 PROCEDURE — 80050 GENERAL HEALTH PANEL: CPT | Performed by: INTERNAL MEDICINE

## 2023-05-18 PROCEDURE — 81003 URINALYSIS AUTO W/O SCOPE: CPT | Performed by: INTERNAL MEDICINE

## 2023-05-18 PROCEDURE — 87077 CULTURE AEROBIC IDENTIFY: CPT | Performed by: INTERNAL MEDICINE

## 2023-05-18 PROCEDURE — 80061 LIPID PANEL: CPT | Performed by: INTERNAL MEDICINE

## 2023-05-18 PROCEDURE — 87186 SC STD MICRODIL/AGAR DIL: CPT | Performed by: INTERNAL MEDICINE

## 2023-05-18 PROCEDURE — 87086 URINE CULTURE/COLONY COUNT: CPT | Performed by: INTERNAL MEDICINE

## 2023-05-18 PROCEDURE — 99214 OFFICE O/P EST MOD 30 MIN: CPT | Performed by: INTERNAL MEDICINE

## 2023-05-18 PROCEDURE — 36415 COLL VENOUS BLD VENIPUNCTURE: CPT | Performed by: INTERNAL MEDICINE

## 2023-05-18 RX ORDER — ESCITALOPRAM OXALATE 10 MG/1
10 TABLET ORAL DAILY
COMMUNITY

## 2023-05-18 NOTE — PROGRESS NOTES
"Chief Complaint  Insomnia (3 month recheck) and Urinary Tract Infection (Urgency, painful urination, felt like the bladder wasn't emptying all the way )    Subjective        Po Edmondson presents to [facility]  History of Present Illness     Po Edmondson presents today for follow-up regarding hypertension, insomnia, depression, and urinary symptoms.    The patient reports having frequency with urination for several weeks, and developed dysuria 6 days ago, and just did not feel good over the weekend with low back pain and urgency.  However, she woke this morning not having any symptoms of a UTI. She states she did a home test that was positive for leukocytes. She notes she is drinking plenty of fluids.  She has not had any antibiotics    Hypertension- she checks her blood pressure at home only occasionally.  sHe notes she watches her sodium intake and is exercising.  She has not had her blood pressure medication yet this morning    Depression- she did not like the way Pristiq made her feel so she has been back on Lexapro 10 mg for depression for 1 month and doing well    Insomnia-patient is currently on Ambien CR and does keep her asleep longer but she feels like she does not fall asleep as quickly as with the plan Ambien she is not sure which she would prefer to be on.  Does not need a refill quite yet        Objective   Vital Signs:  /80   Pulse 68   Resp 18   Ht 164.1 cm (64.61\") Comment: pt reported  Wt 75.5 kg (166 lb 6.4 oz)   SpO2 100%   BMI 28.03 kg/m²   Estimated body mass index is 28.03 kg/m² as calculated from the following:    Height as of this encounter: 164.1 cm (64.61\").    Weight as of this encounter: 75.5 kg (166 lb 6.4 oz).             Physical Exam  Constitutional:       General: She is not in acute distress.     Appearance: Normal appearance.   HENT:      Head: Normocephalic and atraumatic.   Cardiovascular:      Rate and Rhythm: Normal rate and regular " rhythm.      Heart sounds:     No friction rub. No gallop.   Skin:     General: Skin is warm and dry.   Neurological:      Mental Status: She is alert and oriented to person, place, and time.   Psychiatric:         Mood and Affect: Mood normal.         Behavior: Behavior normal.         Thought Content: Thought content normal.         Judgment: Judgment normal.     Recheck blood pressure- 130/82  Result Review :                   Assessment and Plan   Diagnoses and all orders for this visit:    1. Benign essential hypertension (Primary)-improved reading on recheck.  She will check at home as well.  Check labs today  -     Comprehensive Metabolic Panel; Future  -     CBC (No Diff); Future  -     Lipid Panel; Future    2. Primary insomnia- currently taking Ambien CR.  She will let me know whether she would rather switch back to plain Ambien when she needs her next refill.  She has signed controlled substance contract and Fantasma is appropriate    3. Urinary tract infection without hematuria, site unspecified-UA is clear today.  We will send for culture and can treat if the culture is positive  -     POC Urinalysis Dipstick, Automated  -     Urine Culture - , Urine, Clean Catch; Future  -     Urine Culture - Urine, Urine, Clean Catch    4. Acquired hypothyroidism-check levels today  -     TSH; Future             Follow Up   Return in about 3 months (around 8/18/2023) for Next scheduled follow up.  Patient was given instructions and counseling regarding her condition or for health maintenance advice. Please see specific information pulled into the AVS if appropriate.     Transcribed from ambient dictation for Eleanor Diggs MD by Raisa Byrne.  05/18/23   13:06 EDT    Patient or patient representative verbalized consent to the visit recording.  I have personally performed the services described in this document as transcribed by the above individual, and it is both accurate and complete.

## 2023-05-18 NOTE — PROGRESS NOTES
"Answers for HPI/ROS submitted by the patient on 5/16/2023  Please describe your symptoms.: Follow up for medication and labs  Have you had these symptoms before?: Yes  How long have you been having these symptoms?: Greater than 2 weeks  What is the primary reason for your visit?: Other    Chief Complaint  Insomnia (3 month recheck) and Urinary Tract Infection (Urgency, painful urination, felt like the bladder wasn't emptying all the way )    Subjective     {Problem List  Visit Diagnosis   Encounters  Notes  Medications  Labs  Result Review Imaging  Media :23}     Po Edmondson presents to [facility]  History of Present Illness      Current Outpatient Medications:   •  Chlorcyclizine-Pseudoephed (STAHIST AD) 25-60 MG tablet, 1 qd- bid prn allergies, Disp: 60 tablet, Rfl: 11  •  Cholecalciferol (VITAMIN D) 50 MCG (2000 UT) tablet, Take 2,000 Units by mouth Daily., Disp: , Rfl:   •  cyanocobalamin 1000 MCG/ML injection, INJECT 1 ML INTRAMUSCULARLY IN THE APPROPRIATE MUSCLE AS DIRECTED BY PRESCRIBER EVERY 28 DAYS, Disp: 3 mL, Rfl: 3  •  cycloSPORINE (RESTASIS) 0.05 % ophthalmic emulsion, 1 drop 2 (Two) Times a Day., Disp: , Rfl:   •  escitalopram (LEXAPRO) 10 MG tablet, Take 1 tablet by mouth Daily., Disp: , Rfl:   •  fluorouracil (EFUDEX) 5 % cream, 1 application Daily As Needed., Disp: , Rfl:   •  levothyroxine (Euthyrox) 25 MCG tablet, Take 1 tablet by mouth Daily., Disp: 90 tablet, Rfl: 1  •  Syringe/Needle, Disp, (Luer Lock Safety Syringes) 25G X 5/8\" 3 ML misc, 1 each Every 30 (Thirty) Days., Disp: 50 each, Rfl: 0  •  valsartan (DIOVAN) 160 MG tablet, Take 1 tablet by mouth Daily., Disp: 90 tablet, Rfl: 1  •  zolpidem CR (Ambien CR) 12.5 MG CR tablet, Take 1 tablet by mouth At Night As Needed for Sleep., Disp: 30 tablet, Rfl: 2   The following portions of the patient's history were reviewed and updated as appropriate: allergies, current medications, past family history, past " "medical history, past social history, past surgical history and problem list.     Objective   Vital Signs:   /80   Pulse 68   Resp 18   Ht 164.1 cm (64.61\") Comment: pt reported  Wt 75.5 kg (166 lb 6.4 oz)   SpO2 100%   BMI 28.03 kg/m²       Physical exam  Constitutional: oriented to person, place, and time.  well-developed and well-nourished. No distress.   HENT:   Head: Normocephalic and atraumatic.   Eyes: Conjunctivae and EOM are normal.   Cardiovascular: Normal rate, regular rhythm and normal heart sounds.  Exam reveals no gallop and no friction rub.    No murmur heard.  Pulmonary/Chest: Effort normal and breath sounds normal. No respiratory distress.   no wheezes.   Neurological:  alert and oriented to person, place, and time.   Skin: Skin is warm and dry. not diaphoretic.   Psychiatric:  normal mood and affect. behavior is normal. Judgment and thought content normal.    Recheck BP - 142/74    Result Review :{Labs  Result Review  Imaging  Med Tab  Media :23}   The following data was reviewed by: Eleanor Diggs MD on 05/18/2023:  Common labs        11/18/2022    13:57   Common Labs   Glucose 103     BUN 19     Creatinine 0.98     Sodium 138     Potassium 4.6     Chloride 97     Calcium 9.6     Albumin 4.70     Total Bilirubin 0.3     Alkaline Phosphatase 94     AST (SGOT) 20     ALT (SGPT) 16     WBC 6.09     Hemoglobin 11.8     Hematocrit 35.8     Platelets 310       {Labs Reviewed (Optional):29417}  {Data reviewed (Optional):16238:::1}          Assessment and Plan {CC Problem List  Visit Diagnosis  ROS  Review (Popup)  Health Maintenance  Quality  BestPractice  Medications  SmartSets  SnapShot Encounters  Media :23}   Diagnoses and all orders for this visit:    1. Benign essential hypertension (Primary)  -     Comprehensive Metabolic Panel; Future  -     CBC (No Diff); Future    2. Primary insomnia- ambien refilled. Pt has already signed controlled substance contract. Fantasma done " today and appropriate. UDS due    3. Urinary tract infection without hematuria, site unspecified- UA is clear  -     POC Urinalysis Dipstick, Automated  -     Urine Culture - , Urine, Clean Catch; Future    4. Acquired hypothyroidism- we will check today  -     TSH; Future      {Time Spent (Optional):31044}  Follow Up {Instructions Charge Capture  Follow-up Communications :23}  No follow-ups on file.  Patient was given instructions and counseling regarding her condition or for health maintenance advice. Please see specific information pulled into the AVS if appropriate.

## 2023-05-19 LAB
ALBUMIN SERPL-MCNC: 4.9 G/DL (ref 3.5–5.2)
ALBUMIN/GLOB SERPL: 2 G/DL
ALP SERPL-CCNC: 77 U/L (ref 39–117)
ALT SERPL W P-5'-P-CCNC: 19 U/L (ref 1–33)
ANION GAP SERPL CALCULATED.3IONS-SCNC: 9 MMOL/L (ref 5–15)
AST SERPL-CCNC: 24 U/L (ref 1–32)
BILIRUB SERPL-MCNC: 0.3 MG/DL (ref 0–1.2)
BUN SERPL-MCNC: 13 MG/DL (ref 8–23)
BUN/CREAT SERPL: 13.1 (ref 7–25)
CALCIUM SPEC-SCNC: 9.7 MG/DL (ref 8.6–10.5)
CHLORIDE SERPL-SCNC: 100 MMOL/L (ref 98–107)
CHOLEST SERPL-MCNC: 232 MG/DL (ref 0–200)
CO2 SERPL-SCNC: 27 MMOL/L (ref 22–29)
CREAT SERPL-MCNC: 0.99 MG/DL (ref 0.57–1)
EGFRCR SERPLBLD CKD-EPI 2021: 65.4 ML/MIN/1.73
GLOBULIN UR ELPH-MCNC: 2.4 GM/DL
GLUCOSE SERPL-MCNC: 101 MG/DL (ref 65–99)
HDLC SERPL-MCNC: 83 MG/DL (ref 40–60)
LDLC SERPL CALC-MCNC: 135 MG/DL (ref 0–100)
LDLC/HDLC SERPL: 1.6 {RATIO}
POTASSIUM SERPL-SCNC: 4.9 MMOL/L (ref 3.5–5.2)
PROT SERPL-MCNC: 7.3 G/DL (ref 6–8.5)
SODIUM SERPL-SCNC: 136 MMOL/L (ref 136–145)
TRIGL SERPL-MCNC: 79 MG/DL (ref 0–150)
TSH SERPL DL<=0.05 MIU/L-ACNC: 1.72 UIU/ML (ref 0.27–4.2)
VLDLC SERPL-MCNC: 14 MG/DL (ref 5–40)

## 2023-05-20 LAB — BACTERIA SPEC AEROBE CULT: ABNORMAL

## 2023-05-21 RX ORDER — ZOLPIDEM TARTRATE 10 MG/1
10 TABLET ORAL NIGHTLY PRN
Qty: 90 TABLET | Refills: 0 | Status: SHIPPED | OUTPATIENT
Start: 2023-05-21

## 2023-08-02 NOTE — PROGRESS NOTES
"Chief Complaint  No chief complaint on file.    Subjective     {Problem List  Visit Diagnosis   Encounters  Notes  Medications  Labs  Result Review Imaging  Media :23}     Po Edmondson presents to [facility]  History of Present Illness      Current Outpatient Medications:     Chlorcyclizine-Pseudoephed (STAHIST AD) 25-60 MG tablet, 1 qd- bid prn allergies, Disp: 60 tablet, Rfl: 11    Cholecalciferol (VITAMIN D) 50 MCG (2000 UT) tablet, Take 2,000 Units by mouth Daily., Disp: , Rfl:     cyanocobalamin 1000 MCG/ML injection, INJECT 1 ML INTRAMUSCULARLY IN THE APPROPRIATE MUSCLE AS DIRECTED BY PRESCRIBER EVERY 28 DAYS, Disp: 3 mL, Rfl: 3    cycloSPORINE (RESTASIS) 0.05 % ophthalmic emulsion, 1 drop 2 (Two) Times a Day., Disp: , Rfl:     escitalopram (LEXAPRO) 10 MG tablet, Take 1 tablet by mouth Daily., Disp: , Rfl:     fluorouracil (EFUDEX) 5 % cream, 1 application Daily As Needed., Disp: , Rfl:     levothyroxine (Euthyrox) 25 MCG tablet, Take 1 tablet by mouth Daily., Disp: 90 tablet, Rfl: 1    Syringe/Needle, Disp, (Luer Lock Safety Syringes) 25G X 5/8\" 3 ML misc, 1 each Every 30 (Thirty) Days., Disp: 50 each, Rfl: 0    valsartan (DIOVAN) 160 MG tablet, Take 1 tablet by mouth Daily., Disp: 90 tablet, Rfl: 1    zolpidem (AMBIEN) 10 MG tablet, Take 1 tablet by mouth At Night As Needed for Sleep., Disp: 90 tablet, Rfl: 0   The following portions of the patient's history were reviewed and updated as appropriate: allergies, current medications, past family history, past medical history, past social history, past surgical history and problem list.     Objective   Vital Signs:   There were no vitals taken for this visit.      Physical exam  Constitutional: oriented to person, place, and time.  well-developed and well-nourished. No distress.   HENT:   Head: Normocephalic and atraumatic.   Eyes: Conjunctivae and EOM are normal.   Cardiovascular: Normal rate, regular rhythm and normal " heart sounds.  Exam reveals no gallop and no friction rub.    No murmur heard.  Pulmonary/Chest: Effort normal and breath sounds normal. No respiratory distress.   no wheezes.   Neurological:  alert and oriented to person, place, and time.   Skin: Skin is warm and dry. not diaphoretic.   Psychiatric:  normal mood and affect. behavior is normal. Judgment and thought content normal.      Result Review :{Labs  Result Review  Imaging  Med Tab  Media :23}   {The following data was reviewed by (Optional):01160}  {Ambulatory Labs (Optional):06151}  {Labs Reviewed (Optional):00643}  {Data reviewed (Optional):32277:::1}          Assessment and Plan {CC Problem List  Visit Diagnosis  ROS  Review (Popup)  Health Maintenance  Quality  BestPractice  Medications  SmartSets  SnapShot Encounters  Media :23}   There are no diagnoses linked to this encounter.  {Time Spent (Optional):56201}  Follow Up {Instructions Charge Capture  Follow-up Communications :23}  No follow-ups on file.  Patient was given instructions and counseling regarding her condition or for health maintenance advice. Please see specific information pulled into the AVS if appropriate.

## 2023-08-14 NOTE — PROGRESS NOTES
"Here for physical    Exercise: walks some but no other exercise  Diet:tries to eat healthy - likes fruits/veggies. Does like sweets. takes D regularly, no MVI currently    HTN- on valsartan; she does check  at home and usually in 130/70, HR    Hypothyroid - she is on synthroid 25    Insomnia - she is on ambien 10 now, doing better, with the ER she felt a little hung over the next day    Depression/anxiety - she goes between pristiq and lexapro, and is back on pristiq currently for the last 3 weeks and seems to be working well and causing less fatigue than lexparo did    B12 deficiency - she is on injections every month    Current Outpatient Medications:     Chlorcyclizine-Pseudoephed (STAHIST AD) 25-60 MG tablet, 1 qd- bid prn allergies, Disp: 60 tablet, Rfl: 11    Cholecalciferol (VITAMIN D) 50 MCG (2000 UT) tablet, Take 1 tablet by mouth Daily., Disp: , Rfl:     cyanocobalamin 1000 MCG/ML injection, INJECT 1 ML INTRAMUSCULARLY IN THE APPROPRIATE MUSCLE AS DIRECTED BY PRESCRIBER EVERY 28 DAYS, Disp: 3 mL, Rfl: 3    cycloSPORINE (RESTASIS) 0.05 % ophthalmic emulsion, 1 drop 2 (Two) Times a Day., Disp: , Rfl:     desvenlafaxine (PRISTIQ) 50 MG 24 hr tablet, Take 1 tablet by mouth Daily., Disp: , Rfl:     fluorouracil (EFUDEX) 5 % cream, 1 application  Daily As Needed., Disp: , Rfl:     levothyroxine (Euthyrox) 25 MCG tablet, Take 1 tablet by mouth Daily., Disp: 90 tablet, Rfl: 1    Syringe/Needle, Disp, (Luer Lock Safety Syringes) 25G X 5/8\" 3 ML misc, 1 each Every 30 (Thirty) Days., Disp: 50 each, Rfl: 0    valsartan (DIOVAN) 160 MG tablet, Take 1 tablet by mouth Daily., Disp: 90 tablet, Rfl: 1    zolpidem (AMBIEN) 10 MG tablet, Take 1 tablet by mouth At Night As Needed for Sleep., Disp: 90 tablet, Rfl: 0    The following portions of the patient's history were reviewed and updated as appropriate: allergies, current medications, past family history, past medical history, past social history, past surgical history and " "problem list.    Review of Systems   Constitutional:  Negative for activity change, appetite change, fever, unexpected weight gain and unexpected weight loss.   HENT: Negative.     Eyes: Negative.    Respiratory:  Negative for shortness of breath and wheezing.    Cardiovascular:  Negative for chest pain, palpitations and leg swelling.   Gastrointestinal:  Positive for constipation (will use sennekot prn).   Endocrine: Negative.    Genitourinary:  Negative for difficulty urinating and dysuria.   Skin: Negative.    Allergic/Immunologic: Negative for immunocompromised state.   Neurological:  Negative for seizures, speech difficulty, memory problem and confusion.   Hematological:  Does not bruise/bleed easily.   Psychiatric/Behavioral:  Negative for agitation.        Objective    /76 (BP Location: Right arm, Patient Position: Sitting)   Pulse 80   Temp 97.1 øF (36.2 øC) (Infrared)   Ht 161.5 cm (63.6\")   Wt 74.8 kg (165 lb)   SpO2 98%   BMI 28.68 kg/mý   Physical Exam   Physical Exam  Vitals and nursing note reviewed.   Constitutional:       General: She is not in acute distress.     Appearance: Normal appearance. She is well-developed. She is not diaphoretic.   HENT:      Head: Normocephalic and atraumatic.      Right Ear: External ear normal.      Left Ear: External ear normal.      Nose: Nose normal.      Mouth/Throat:      Pharynx: No oropharyngeal exudate.   Eyes:      General: No scleral icterus.        Right eye: No discharge.         Left eye: No discharge.      Conjunctiva/sclera: Conjunctivae normal.      Pupils: Pupils are equal, round, and reactive to light.   Neck:      Thyroid: No thyromegaly.   Cardiovascular:      Rate and Rhythm: Normal rate and regular rhythm.      Heart sounds: Normal heart sounds. No murmur heard.    No friction rub. No gallop.   Pulmonary:      Effort: Pulmonary effort is normal. No respiratory distress.      Breath sounds: Normal breath sounds. No wheezing or rales. "   Abdominal:      General: Bowel sounds are normal. There is no distension.      Palpations: Abdomen is soft. There is no mass.      Tenderness: There is no abdominal tenderness. There is no guarding or rebound.   Musculoskeletal:         General: No deformity. Normal range of motion.      Cervical back: Normal range of motion and neck supple.   Lymphadenopathy:      Cervical: No cervical adenopathy.   Skin:     General: Skin is warm and dry.      Coloration: Skin is not pale.      Findings: No erythema or rash.   Neurological:      Mental Status: She is alert and oriented to person, place, and time.      Coordination: Coordination normal.      Deep Tendon Reflexes: Reflexes normal.   Psychiatric:         Behavior: Behavior normal.         Thought Content: Thought content normal.         Judgment: Judgment normal.         Assessment/Plan   Diagnoses and all orders for this visit:    1. Routine general medical examination at a health care facility (Primary)  -     POC Urinalysis Dipstick, Automated  Regular exercise/healthy diet. BSE q month. Sunscreen use encouraged.  Amina due now at [facility]-she will schedule  Colon due '27 (5 yr f/u Dr Manning)  Tdap due now-we will give today  Shingrix-we will get today, side effects reviewed  DEXA due- we will schedule  Pap - she will re-establish w/ GYN    2. Benign essential hypertension-good control.  Recheck metabolic panel in 11/23  -     valsartan (DIOVAN) 160 MG tablet; Take 1 tablet by mouth Daily.  Dispense: 90 tablet; Refill: 1    3. Vitamin D deficiency-continue vitamin D supplement    4. Primary insomnia-stable on Ambien.  She has signed controlled substance contract.  [facility] appropriate.  -     zolpidem (AMBIEN) 10 MG tablet; Take 1 tablet by mouth At Night As Needed for Sleep.  Dispense: 90 tablet; Refill: 0    5. Acquired hypothyroidism-TSH looked good in 5/23  -     levothyroxine (Euthyrox) 25 MCG tablet; Take 1 tablet by mouth Daily.  Dispense: 90 tablet; Refill:  1    6. Need for Tdap vaccination  -     Tdap Vaccine Greater Than or Equal To 8yo IM    7. Need for shingles vaccine  -     Shingrix Vaccine    Other orders  -     desvenlafaxine (PRISTIQ) 50 MG 24 hr tablet; Take 1 tablet by mouth Daily.  Dispense: 90 tablet; Refill: 3

## 2023-08-16 ENCOUNTER — OFFICE VISIT (OUTPATIENT)
Dept: INTERNAL MEDICINE | Facility: CLINIC | Age: 63
End: 2023-08-16
Payer: COMMERCIAL

## 2023-08-16 VITALS
OXYGEN SATURATION: 98 % | WEIGHT: 165 LBS | BODY MASS INDEX: 28.17 KG/M2 | TEMPERATURE: 97.1 F | HEIGHT: 64 IN | DIASTOLIC BLOOD PRESSURE: 76 MMHG | HEART RATE: 80 BPM | SYSTOLIC BLOOD PRESSURE: 132 MMHG

## 2023-08-16 DIAGNOSIS — Z23 NEED FOR TDAP VACCINATION: ICD-10-CM

## 2023-08-16 DIAGNOSIS — E03.9 ACQUIRED HYPOTHYROIDISM: Chronic | ICD-10-CM

## 2023-08-16 DIAGNOSIS — E55.9 VITAMIN D DEFICIENCY: Chronic | ICD-10-CM

## 2023-08-16 DIAGNOSIS — Z23 NEED FOR SHINGLES VACCINE: ICD-10-CM

## 2023-08-16 DIAGNOSIS — I10 BENIGN ESSENTIAL HYPERTENSION: Chronic | ICD-10-CM

## 2023-08-16 DIAGNOSIS — Z00.00 ROUTINE GENERAL MEDICAL EXAMINATION AT A HEALTH CARE FACILITY: Primary | ICD-10-CM

## 2023-08-16 DIAGNOSIS — F51.01 PRIMARY INSOMNIA: Chronic | ICD-10-CM

## 2023-08-16 LAB
BILIRUB BLD-MCNC: NEGATIVE MG/DL
CLARITY, POC: CLEAR
COLOR UR: YELLOW
EXPIRATION DATE: NORMAL
GLUCOSE UR STRIP-MCNC: NEGATIVE MG/DL
KETONES UR QL: NEGATIVE
LEUKOCYTE EST, POC: NEGATIVE
Lab: NORMAL
NITRITE UR-MCNC: NEGATIVE MG/ML
PH UR: 7 [PH] (ref 5–8)
PROT UR STRIP-MCNC: NEGATIVE MG/DL
RBC # UR STRIP: NEGATIVE /UL
SP GR UR: 1.01 (ref 1–1.03)
UROBILINOGEN UR QL: NORMAL

## 2023-08-16 RX ORDER — LEVOTHYROXINE SODIUM 0.03 MG/1
25 TABLET ORAL DAILY
Qty: 90 TABLET | Refills: 1 | Status: SHIPPED | OUTPATIENT
Start: 2023-08-16

## 2023-08-16 RX ORDER — DESVENLAFAXINE SUCCINATE 50 MG/1
50 TABLET, EXTENDED RELEASE ORAL DAILY
COMMUNITY
End: 2023-08-16 | Stop reason: SDUPTHER

## 2023-08-16 RX ORDER — VALSARTAN 160 MG/1
160 TABLET ORAL DAILY
Qty: 90 TABLET | Refills: 1 | Status: SHIPPED | OUTPATIENT
Start: 2023-08-16

## 2023-08-16 RX ORDER — DESVENLAFAXINE SUCCINATE 50 MG/1
50 TABLET, EXTENDED RELEASE ORAL DAILY
Qty: 90 TABLET | Refills: 3 | Status: SHIPPED | OUTPATIENT
Start: 2023-08-16

## 2023-08-16 RX ORDER — ZOLPIDEM TARTRATE 10 MG/1
10 TABLET ORAL NIGHTLY PRN
Qty: 90 TABLET | Refills: 0 | Status: SHIPPED | OUTPATIENT
Start: 2023-08-16

## 2023-08-16 NOTE — LETTER
"VACCINE CONSENT FORM      Patient Name:  Po Edmondson    Patient :  1960      I/We have read, or have been explained, the information about the diseases and the vaccines listed below.  There was an opportunity to ask questions and any questions were answered satisfactorily.  I/We believe that I/we understand the benefits and risks of the vaccines(s) cited, and ask the vaccine(s) listed below be given to me/us or the person named above (for which i have authorized to make the request).      Vaccine(s) give:    No orders of the defined types were placed in this encounter.        Medicare patients:    The only vaccine covered under your medical benefit is flu/pneumonia and hepatitis B.  All other may be covered under your \"Part D\" prescription plan and requires you to go to a pharmacy with a Physician orders for administration.  If you still prefer to have it administered at our office, you will receive a bill for the vaccine and administration cost.               Patient Initials                     Patient or Parent/Guardian Signature                    Date        A copy of the appropriate Centers for Disease Control and Prevention Vaccine Information Statements has been provided.   "

## 2023-08-16 NOTE — LETTER
"VACCINE CONSENT FORM      Patient Name:  Po Edmondson    Patient :  1960      I/We have read, or have been explained, the information about the diseases and the vaccines listed below.  There was an opportunity to ask questions and any questions were answered satisfactorily.  I/We believe that I/we understand the benefits and risks of the vaccines(s) cited, and ask the vaccine(s) listed below be given to me/us or the person named above (for which i have authorized to make the request).      Vaccine(s) give:    Orders Placed This Encounter   Procedures    Tdap Vaccine Greater Than or Equal To 8yo IM    Shingrix Vaccine         Medicare patients:    The only vaccine covered under your medical benefit is flu/pneumonia and hepatitis B.  All other may be covered under your \"Part D\" prescription plan and requires you to go to a pharmacy with a Physician orders for administration.  If you still prefer to have it administered at our office, you will receive a bill for the vaccine and administration cost.               Patient Initials                     Patient or Parent/Guardian Signature                    Date        A copy of the appropriate Centers for Disease Control and Prevention Vaccine Information Statements has been provided.   "

## 2023-11-02 NOTE — ASSESSMENT & PLAN NOTE
We will recheck TSH in 3 months at her next appointment.    Quality 110: Preventive Care And Screening: Influenza Immunization: Influenza Immunization previously received during influenza season Detail Level: Detailed Quality 226: Preventive Care And Screening: Tobacco Use: Screening And Cessation Intervention: Patient screened for tobacco use and is an ex/non-smoker

## 2023-11-08 ENCOUNTER — LAB (OUTPATIENT)
Dept: INTERNAL MEDICINE | Facility: CLINIC | Age: 63
End: 2023-11-08
Payer: COMMERCIAL

## 2023-11-08 ENCOUNTER — OFFICE VISIT (OUTPATIENT)
Dept: INTERNAL MEDICINE | Facility: CLINIC | Age: 63
End: 2023-11-08
Payer: COMMERCIAL

## 2023-11-08 VITALS
OXYGEN SATURATION: 97 % | TEMPERATURE: 97.3 F | BODY MASS INDEX: 27.83 KG/M2 | SYSTOLIC BLOOD PRESSURE: 128 MMHG | WEIGHT: 163 LBS | HEIGHT: 64 IN | DIASTOLIC BLOOD PRESSURE: 78 MMHG | HEART RATE: 70 BPM

## 2023-11-08 DIAGNOSIS — E03.9 ACQUIRED HYPOTHYROIDISM: Chronic | ICD-10-CM

## 2023-11-08 DIAGNOSIS — I10 BENIGN ESSENTIAL HYPERTENSION: ICD-10-CM

## 2023-11-08 DIAGNOSIS — J30.9 ALLERGIC RHINITIS, UNSPECIFIED SEASONALITY, UNSPECIFIED TRIGGER: ICD-10-CM

## 2023-11-08 DIAGNOSIS — F43.21 PROLONGED DEPRESSIVE ADJUSTMENT REACTION: ICD-10-CM

## 2023-11-08 DIAGNOSIS — Z23 NEED FOR INFLUENZA VACCINATION: ICD-10-CM

## 2023-11-08 DIAGNOSIS — F51.01 PRIMARY INSOMNIA: Primary | Chronic | ICD-10-CM

## 2023-11-08 LAB
DEPRECATED RDW RBC AUTO: 37.6 FL (ref 37–54)
ERYTHROCYTE [DISTWIDTH] IN BLOOD BY AUTOMATED COUNT: 11.8 % (ref 12.3–15.4)
HCT VFR BLD AUTO: 34.1 % (ref 34–46.6)
HGB BLD-MCNC: 11.5 G/DL (ref 12–15.9)
MCH RBC QN AUTO: 29.6 PG (ref 26.6–33)
MCHC RBC AUTO-ENTMCNC: 33.7 G/DL (ref 31.5–35.7)
MCV RBC AUTO: 87.9 FL (ref 79–97)
PLATELET # BLD AUTO: 317 10*3/MM3 (ref 140–450)
PMV BLD AUTO: 11.2 FL (ref 6–12)
RBC # BLD AUTO: 3.88 10*6/MM3 (ref 3.77–5.28)
WBC NRBC COR # BLD: 5.31 10*3/MM3 (ref 3.4–10.8)

## 2023-11-08 PROCEDURE — 36415 COLL VENOUS BLD VENIPUNCTURE: CPT | Performed by: INTERNAL MEDICINE

## 2023-11-08 PROCEDURE — 80050 GENERAL HEALTH PANEL: CPT | Performed by: INTERNAL MEDICINE

## 2023-11-08 RX ORDER — ESCITALOPRAM OXALATE 10 MG/1
10 TABLET ORAL DAILY
COMMUNITY

## 2023-11-08 RX ORDER — ZOLPIDEM TARTRATE 10 MG/1
10 TABLET ORAL NIGHTLY PRN
Qty: 90 TABLET | Refills: 0 | Status: SHIPPED | OUTPATIENT
Start: 2023-11-08

## 2023-11-08 RX ORDER — BUPROPION HYDROCHLORIDE 150 MG/1
150 TABLET ORAL DAILY
Qty: 30 TABLET | Refills: 5 | Status: SHIPPED | OUTPATIENT
Start: 2023-11-08

## 2023-11-08 RX ORDER — MONTELUKAST SODIUM 10 MG/1
10 TABLET ORAL NIGHTLY
Qty: 30 TABLET | Refills: 5 | Status: SHIPPED | OUTPATIENT
Start: 2023-11-08

## 2023-11-08 NOTE — PROGRESS NOTES
Answers submitted by the patient for this visit:  Other (Submitted on 11/1/2023)  Please describe your symptoms.: Routine visit for medication refill, labs  Have you had these symptoms before?: Yes  How long have you been having these symptoms?: Greater than 2 weeks  Please list any medications you are currently taking for this condition.: Ambien  Please describe any probable cause for these symptoms. : N/a  Primary Reason for Visit (Submitted on 11/1/2023)  What is the primary reason for your visit?: Other  Chief Complaint  Insomnia (3 month f/u), Depression (Stopped pristiq she did wean fast.  She is really tired.), and Anxiety    Subjective          Po Edmondson presents to [facility]  History of Present Illness    HTN- on valsartan; she does check  at home and usually in 120-130s/70-80 range     Hypothyroid - she is on synthroid 25     Insomnia - she is on ambien 10 , last filled 8/23 for 90 days. Helps some.      Depression/anxiety - she goes between pristiq and lexapro, and is back on  lexapro now over last month. She weaned over about a week. Has felt OK w/ the lexapro, but is wondering about adding wellbutrin. She had been on it about 5 years ago. Thinks she tolerated OK     B12 deficiency - she is on injections every month    Allergies - ears feeling full in recent weeks. She takes stahist and uses flonase (just started), not a lot of mucus, more congestion    Current Outpatient Medications:     Chlorcyclizine-Pseudoephed (STAHIST AD) 25-60 MG tablet, 1 qd- bid prn allergies, Disp: 60 tablet, Rfl: 11    Cholecalciferol (VITAMIN D) 50 MCG (2000 UT) tablet, Take 1 tablet by mouth Daily., Disp: , Rfl:     cyanocobalamin 1000 MCG/ML injection, INJECT 1 ML INTRAMUSCULARLY IN THE APPROPRIATE MUSCLE AS DIRECTED BY PRESCRIBER EVERY 28 DAYS, Disp: 3 mL, Rfl: 3    escitalopram (LEXAPRO) 10 MG tablet, Take 1 tablet by mouth Daily., Disp: , Rfl:     fluorouracil (EFUDEX) 5 % cream, 1  "application  Daily As Needed., Disp: , Rfl:     levothyroxine (Euthyrox) 25 MCG tablet, Take 1 tablet by mouth Daily., Disp: 90 tablet, Rfl: 1    Syringe/Needle, Disp, (Luer Lock Safety Syringes) 25G X 5/8\" 3 ML misc, 1 each Every 30 (Thirty) Days., Disp: 50 each, Rfl: 0    valsartan (DIOVAN) 160 MG tablet, Take 1 tablet by mouth Daily., Disp: 90 tablet, Rfl: 1    zolpidem (AMBIEN) 10 MG tablet, Take 1 tablet by mouth At Night As Needed for Sleep., Disp: 90 tablet, Rfl: 0    buPROPion XL (Wellbutrin XL) 150 MG 24 hr tablet, Take 1 tablet by mouth Daily., Disp: 30 tablet, Rfl: 5    montelukast (Singulair) 10 MG tablet, Take 1 tablet by mouth Every Night., Disp: 30 tablet, Rfl: 5   The following portions of the patient's history were reviewed and updated as appropriate: allergies, current medications, past family history, past medical history, past social history, past surgical history and problem list.     Objective   Vital Signs:   /78 (BP Location: Left arm, Patient Position: Sitting)   Pulse 70   Temp 97.3 °F (36.3 °C) (Infrared)   Ht 161.5 cm (63.6\")   Wt 73.9 kg (163 lb)   SpO2 97%   BMI 28.33 kg/m²       Physical exam  Constitutional: oriented to person, place, and time.  well-developed and well-nourished. No distress.   HENT:   Head: Normocephalic and atraumatic.   R tm - dull, no erythema, L tm - normal  Eyes: Conjunctivae and EOM are normal.   Cardiovascular: Normal rate, regular rhythm and normal heart sounds.  Exam reveals no gallop and no friction rub.    No murmur heard.  Pulmonary/Chest: Effort normal and breath sounds normal. No respiratory distress.   no wheezes.   Neurological:  alert and oriented to person, place, and time.   Skin: Skin is warm and dry. not diaphoretic.   Psychiatric:  normal mood and affect. behavior is normal. Judgment and thought content normal.      Result Review :   The following data was reviewed by: Eleanor Diggs MD on 11/08/2023:  Common labs          " 11/18/2022    13:57 5/18/2023    12:01   Common Labs   Glucose 103  101    BUN 19  13    Creatinine 0.98  0.99    Sodium 138  136    Potassium 4.6  4.9    Chloride 97  100    Calcium 9.6  9.7    Albumin 4.70  4.9    Total Bilirubin 0.3  0.3    Alkaline Phosphatase 94  77    AST (SGOT) 20  24    ALT (SGPT) 16  19    WBC 6.09  4.93    Hemoglobin 11.8  12.4    Hematocrit 35.8  36.9    Platelets 310  328    Total Cholesterol  232    Triglycerides  79    HDL Cholesterol  83    LDL Cholesterol   135                  Assessment and Plan    Diagnoses and all orders for this visit:    1. Primary insomnia (Primary)- stable on ambien. Pt has already signed controlled substance contract. Fantasma done today and appropriate.   -     zolpidem (AMBIEN) 10 MG tablet; Take 1 tablet by mouth At Night As Needed for Sleep.  Dispense: 90 tablet; Refill: 0    2. Need for influenza vaccination  -     Fluzone >6 Months ([id number])    3. Benign essential hypertension- good control  -     CBC (No Diff); Future  -     Comprehensive Metabolic Panel; Future  -     TSH; Future    4. Acquired hypothyroidism- check today  -     TSH; Future    5. Prolonged depressive adjustment reaction- start wellbutrin  -     buPROPion XL (Wellbutrin XL) 150 MG 24 hr tablet; Take 1 tablet by mouth Daily.  Dispense: 30 tablet; Refill: 5    6. Allergic rhinitis, unspecified seasonality, unspecified trigger- add singulair  -     montelukast (Singulair) 10 MG tablet; Take 1 tablet by mouth Every Night.  Dispense: 30 tablet; Refill: 5        Follow Up   Return in about 3 months (around 2/8/2024).  Patient was given instructions and counseling regarding her condition or for health maintenance advice. Please see specific information pulled into the AVS if appropriate.

## 2023-11-09 LAB
ALBUMIN SERPL-MCNC: 4.6 G/DL (ref 3.5–5.2)
ALBUMIN/GLOB SERPL: 2.2 G/DL
ALP SERPL-CCNC: 79 U/L (ref 39–117)
ALT SERPL W P-5'-P-CCNC: 21 U/L (ref 1–33)
ANION GAP SERPL CALCULATED.3IONS-SCNC: 11.1 MMOL/L (ref 5–15)
AST SERPL-CCNC: 23 U/L (ref 1–32)
BILIRUB SERPL-MCNC: 0.4 MG/DL (ref 0–1.2)
BUN SERPL-MCNC: 10 MG/DL (ref 8–23)
BUN/CREAT SERPL: 11 (ref 7–25)
CALCIUM SPEC-SCNC: 9.4 MG/DL (ref 8.6–10.5)
CHLORIDE SERPL-SCNC: 98 MMOL/L (ref 98–107)
CO2 SERPL-SCNC: 25.9 MMOL/L (ref 22–29)
CREAT SERPL-MCNC: 0.91 MG/DL (ref 0.57–1)
EGFRCR SERPLBLD CKD-EPI 2021: 71.9 ML/MIN/1.73
GLOBULIN UR ELPH-MCNC: 2.1 GM/DL
GLUCOSE SERPL-MCNC: 92 MG/DL (ref 65–99)
POTASSIUM SERPL-SCNC: 4.4 MMOL/L (ref 3.5–5.2)
PROT SERPL-MCNC: 6.7 G/DL (ref 6–8.5)
SODIUM SERPL-SCNC: 135 MMOL/L (ref 136–145)
TSH SERPL DL<=0.05 MIU/L-ACNC: 2.55 UIU/ML (ref 0.27–4.2)

## 2023-11-28 RX ORDER — ESCITALOPRAM OXALATE 10 MG/1
10 TABLET ORAL DAILY
Qty: 90 TABLET | Refills: 3 | Status: SHIPPED | OUTPATIENT
Start: 2023-11-28

## 2024-01-16 DIAGNOSIS — D51.0 PERNICIOUS ANEMIA: ICD-10-CM

## 2024-01-16 RX ORDER — CYANOCOBALAMIN 1000 UG/ML
INJECTION, SOLUTION INTRAMUSCULAR; SUBCUTANEOUS
Qty: 3 ML | Refills: 0 | Status: SHIPPED | OUTPATIENT
Start: 2024-01-16

## 2024-01-21 DIAGNOSIS — F43.21 PROLONGED DEPRESSIVE ADJUSTMENT REACTION: ICD-10-CM

## 2024-01-22 RX ORDER — BUPROPION HYDROCHLORIDE 150 MG/1
150 TABLET ORAL DAILY
Qty: 30 TABLET | Refills: 5 | OUTPATIENT
Start: 2024-01-22

## 2024-01-24 ENCOUNTER — TELEPHONE (OUTPATIENT)
Dept: INTERNAL MEDICINE | Facility: CLINIC | Age: 64
End: 2024-01-24
Payer: COMMERCIAL

## 2024-01-24 DIAGNOSIS — F43.21 PROLONGED DEPRESSIVE ADJUSTMENT REACTION: ICD-10-CM

## 2024-01-24 RX ORDER — BUPROPION HYDROCHLORIDE 150 MG/1
150 TABLET ORAL DAILY
Qty: 90 TABLET | Refills: 0 | Status: SHIPPED | OUTPATIENT
Start: 2024-01-24

## 2024-01-24 NOTE — TELEPHONE ENCOUNTER
----- Message from Po Edmondson sent at 1/23/2024  7:54 PM EST -----  Regarding: Medication   Contact: [phone]  Dr Diggs, I sent a request for Wellbutrin 150 XL for 90 day supply.i I took my last pill yesterday on 1 /22/23. Can you you call in my prescription. Thank you Po Edmondson

## 2024-01-24 NOTE — TELEPHONE ENCOUNTER
Gave her the correct fax number to send the order over to be signed      Wellbutrin sent for a 90 day fill.

## 2024-02-08 ENCOUNTER — OFFICE VISIT (OUTPATIENT)
Dept: INTERNAL MEDICINE | Facility: CLINIC | Age: 64
End: 2024-02-08
Payer: COMMERCIAL

## 2024-02-08 VITALS
OXYGEN SATURATION: 100 % | TEMPERATURE: 96.9 F | DIASTOLIC BLOOD PRESSURE: 72 MMHG | WEIGHT: 162 LBS | HEIGHT: 64 IN | BODY MASS INDEX: 27.66 KG/M2 | HEART RATE: 68 BPM | SYSTOLIC BLOOD PRESSURE: 140 MMHG

## 2024-02-08 DIAGNOSIS — I10 BENIGN ESSENTIAL HYPERTENSION: Primary | Chronic | ICD-10-CM

## 2024-02-08 DIAGNOSIS — E03.9 ACQUIRED HYPOTHYROIDISM: Chronic | ICD-10-CM

## 2024-02-08 DIAGNOSIS — F43.21 PROLONGED DEPRESSIVE ADJUSTMENT REACTION: ICD-10-CM

## 2024-02-08 DIAGNOSIS — F51.01 PRIMARY INSOMNIA: Chronic | ICD-10-CM

## 2024-02-08 DIAGNOSIS — Z23 NEED FOR SHINGLES VACCINE: ICD-10-CM

## 2024-02-08 DIAGNOSIS — J31.0 NONALLERGIC RHINITIS: ICD-10-CM

## 2024-02-08 PROCEDURE — 90750 HZV VACC RECOMBINANT IM: CPT | Performed by: INTERNAL MEDICINE

## 2024-02-08 PROCEDURE — 99214 OFFICE O/P EST MOD 30 MIN: CPT | Performed by: INTERNAL MEDICINE

## 2024-02-08 RX ORDER — LEVOTHYROXINE SODIUM 0.03 MG/1
25 TABLET ORAL DAILY
Qty: 90 TABLET | Refills: 0 | Status: SHIPPED | OUTPATIENT
Start: 2024-02-08

## 2024-02-08 RX ORDER — ZOLPIDEM TARTRATE 10 MG/1
10 TABLET ORAL NIGHTLY PRN
Qty: 90 TABLET | Refills: 0 | Status: SHIPPED | OUTPATIENT
Start: 2024-02-08

## 2024-02-08 RX ORDER — VALSARTAN 160 MG/1
160 TABLET ORAL DAILY
Qty: 90 TABLET | Refills: 1 | Status: SHIPPED | OUTPATIENT
Start: 2024-02-08

## 2024-02-08 RX ORDER — BUPROPION HYDROCHLORIDE 150 MG/1
150 TABLET ORAL EVERY MORNING
Qty: 90 TABLET | Refills: 3 | Status: SHIPPED | OUTPATIENT
Start: 2024-02-08

## 2024-02-08 RX ORDER — FLUTICASONE PROPIONATE 50 MCG
2 SPRAY, SUSPENSION (ML) NASAL DAILY
Qty: 16 G | Refills: 11 | Status: SHIPPED | OUTPATIENT
Start: 2024-02-08

## 2024-02-08 NOTE — LETTER
[facility]  Vaccine Consent Form    Patient Name:  Po Edmondson  Patient :  1960     Vaccine(s) Ordered    Shingrix Vaccine        Screening Checklist  The following questions should be completed prior to vaccination. If you answer “yes” to any question, it does not necessarily mean you should not be vaccinated. It just means we may need to clarify or ask more questions. If a question is unclear, please ask your healthcare provider to explain it.    Yes No   Any fever or moderate to severe illness today (mild illness and/or antibiotic treatment are not contraindications)?     Do you have a history of a serious reaction to any previous vaccinations, such as anaphylaxis, encephalopathy within 7 days, Guillain-[place] syndrome within 6 weeks, seizure?     Have you received any live vaccine(s) (e.g MMR, ERNST) or any other vaccines in the last month (to ensure duplicate doses aren't given)?     Do you have an anaphylactic allergy to latex (DTaP, DTaP-IPV, Hep A, Hep B, MenB, RV, Td, Tdap), baker’s yeast (Hep B, HPV), polysorbates (RSV, nirsevimab, PCV 20, Rotavirrus, Tdap, Shingrix), or gelatin (ERNST, MMR)?     Do you have an anaphylactic allergy to neomycin (Rabies, ERNST, MMR, IPV, Hep A), polymyxin B (IPV), or streptomycin (IPV)?      Any cancer, leukemia, AIDS, or other immune system disorder? (ERNST, MMR, RV)     Do you have a parent, brother, or sister with an immune system problem (if immune competence of vaccine recipient clinically verified, can proceed)? (MMR, ERNST)     Any recent steroid treatments for >2 weeks, chemotherapy, or radiation treatment? (ERNST, MMR)     Have you received antibody-containing blood transfusions or IVIG in the past 11 months (recommended interval is dependent on product)? (MMR, ERNST)     Have you taken antiviral drugs (acyclovir, famciclovir, valacyclovir for ERNST) in the last 24 or 48 hours, respectively?      Are you pregnant or planning to become pregnant within 1 month? (ERNST, MMR,  "HPV, IPV, MenB, Abrexvy; For Hep B- refer to Engerix-B; For RSV - Abrysvo is indicated for 32-36 weeks of pregnancy from September to January)     For infants, have you ever been told your child has had intussusception or a medical emergency involving obstruction of the intestine (Rotavirus)? If not for an infant, can skip this question.         *Ordering Physicians/APC should be consulted if \"yes\" is checked by the patient or guardian above.  I have received, read, and understand the Vaccine Information Statement (VIS) for each vaccine ordered.  I have considered my or my child's health status as well as the health status of my close contacts.  I have taken the opportunity to discuss my vaccine questions with my or my child's health care provider.   I have requested that the ordered vaccine(s) be given to me or my child.  I understand the benefits and risks of the vaccines.  I understand that I should remain in the clinic for 15 minutes after receiving the vaccine(s).  _________________________________________________________  Signature of Patient or Parent/Legal Guardian ____________________  Date     "

## 2024-02-08 NOTE — PROGRESS NOTES
Answers submitted by the patient for this visit:  Other (Submitted on 2/6/2024)  Please describe your symptoms.: Routine medication  Have you had these symptoms before?: Yes  How long have you been having these symptoms?: Greater than 2 weeks  Please list any medications you are currently taking for this condition.: Ambien 10mg  Please describe any probable cause for these symptoms. : None  Primary Reason for Visit (Submitted on 2/6/2024)  What is the primary reason for your visit?: Other  Chief Complaint  Insomnia, Hypertension, Hypothyroidism, Depression, and Med Refill    Subjective          Po Edmondson presents to [facility]  History of Present Illness    Depression - she feels like wellbutrin is helping and she has not had side effects w/ it. She is taking at night though, as she felt she flushed taking it in am. She doesn't feel it has made her sleep any worse taking it at night    Insomnia- she is on ambien nightly and does help    Htn - she checks at home occasionally but  not regularly. She is on valsartan    Allergies - has had more drainage recently. She is on singulair and will use stahist as needed, no more than once a day. Did use flonase last night      Current Outpatient Medications:     buPROPion XL (Wellbutrin XL) 150 MG 24 hr tablet, Take 1 tablet by mouth Every Morning., Disp: 90 tablet, Rfl: 3    Chlorcyclizine-Pseudoephed (STAHIST AD) 25-60 MG tablet, 1 qd- bid prn allergies, Disp: 60 tablet, Rfl: 11    Cholecalciferol (VITAMIN D) 50 MCG (2000 UT) tablet, Take 1 tablet by mouth Daily., Disp: , Rfl:     cyanocobalamin 1000 MCG/ML injection, INJECT 1ML INTRAMUSCULARLY IN THE APPROPRIATE MUSCLE AS DIRECTED BY PRESCRIBER EVERY 28 DAYS, Disp: 3 mL, Rfl: 0    escitalopram (LEXAPRO) 10 MG tablet, Take 1 tablet by mouth Daily., Disp: 90 tablet, Rfl: 3    fluorouracil (EFUDEX) 5 % cream, 1 Application Daily As Needed., Disp: , Rfl:     levothyroxine (Euthyrox) 25 MCG  "tablet, Take 1 tablet by mouth Daily., Disp: 90 tablet, Rfl: 0    montelukast (Singulair) 10 MG tablet, Take 1 tablet by mouth Every Night., Disp: 30 tablet, Rfl: 5    Syringe/Needle, Disp, (Luer Lock Safety Syringes) 25G X 5/8\" 3 ML misc, 1 each Every 30 (Thirty) Days., Disp: 50 each, Rfl: 0    valsartan (DIOVAN) 160 MG tablet, Take 1 tablet by mouth Daily., Disp: 90 tablet, Rfl: 1    zolpidem (AMBIEN) 10 MG tablet, Take 1 tablet by mouth At Night As Needed for Sleep., Disp: 90 tablet, Rfl: 0    fluticasone (FLONASE) 50 MCG/ACT nasal spray, 2 sprays into the nostril(s) as directed by provider Daily., Disp: 16 g, Rfl: 11   The following portions of the patient's history were reviewed and updated as appropriate: allergies, current medications, past family history, past medical history, past social history, past surgical history and problem list.     Objective   Vital Signs:   /72 (BP Location: Left arm, Patient Position: Sitting)   Pulse 68   Temp 96.9 °F (36.1 °C) (Infrared)   Ht 161.5 cm (63.6\")   Wt 73.5 kg (162 lb)   SpO2 100%   BMI 28.16 kg/m²       Physical exam  Constitutional: oriented to person, place, and time.  well-developed and well-nourished. No distress.   HENT:   Head: Normocephalic and atraumatic.   Eyes: Conjunctivae and EOM are normal.   Ears: Right canal w/ some cerumen. Left TM normal  OP: no erythema, no drainage  Cardiovascular: Normal rate, regular rhythm and normal heart sounds.  Exam reveals no gallop and no friction rub.    1/6 kelli  Pulmonary/Chest: Effort normal and breath sounds normal. No respiratory distress.   no wheezes.   Neurological:  alert and oriented to person, place, and time.   Skin: Skin is warm and dry. not diaphoretic.   Psychiatric:  normal mood and affect. behavior is normal. Judgment and thought content normal.    Recheck /74    Result Review :                   Assessment and Plan    Diagnoses and all orders for this visit:    1. Benign essential " hypertension (Primary)- BP is slightly high today - she will check at home and let me know readings  -     valsartan (DIOVAN) 160 MG tablet; Take 1 tablet by mouth Daily.  Dispense: 90 tablet; Refill: 1    2. Prolonged depressive adjustment reaction- doing ok w/ wellbutrin. Discussed it is usually recommended to take earlier in the day  -     buPROPion XL (Wellbutrin XL) 150 MG 24 hr tablet; Take 1 tablet by mouth Every Morning.  Dispense: 90 tablet; Refill: 3    3. Acquired hypothyroidism- we will recheck at next visit  -     levothyroxine (Euthyrox) 25 MCG tablet; Take 1 tablet by mouth Daily.  Dispense: 90 tablet; Refill: 0    4. Primary insomnia- stable on ambien. Pt has already signed controlled substance contract. Fantasma done today and appropriate. U  -     zolpidem (AMBIEN) 10 MG tablet; Take 1 tablet by mouth At Night As Needed for Sleep.  Dispense: 90 tablet; Refill: 0    5. Need for shingles vaccine  -     Shingrix Vaccine    6. Allergies - she will take flonase more regularly to see if it helps  -     fluticasone (FLONASE) 50 MCG/ACT nasal spray; 2 sprays into the nostril(s) as directed by provider Daily.  Dispense: 16 g; Refill: 11        Follow Up   No follow-ups on file.  Patient was given instructions and counseling regarding her condition or for health maintenance advice. Please see specific information pulled into the AVS if appropriate.

## 2024-03-13 DIAGNOSIS — J30.9 ALLERGIC RHINITIS, UNSPECIFIED SEASONALITY, UNSPECIFIED TRIGGER: ICD-10-CM

## 2024-03-13 RX ORDER — MONTELUKAST SODIUM 10 MG/1
10 TABLET ORAL NIGHTLY
Qty: 30 TABLET | Refills: 5 | Status: SHIPPED | OUTPATIENT
Start: 2024-03-13

## 2024-03-13 NOTE — TELEPHONE ENCOUNTER
Rx Refill Note  Requested Prescriptions     Pending Prescriptions Disp Refills    montelukast (Singulair) 10 MG tablet 30 tablet 5     Sig: Take 1 tablet by mouth Every Night.      Last office visit with prescribing clinician: 2/8/2024   Last telemedicine visit with prescribing clinician: Visit date not found   Next office visit with prescribing clinician: 5/9/2024       Chacha Elizabeth MA  03/13/24, 11:16 EDT

## 2024-04-03 DIAGNOSIS — D51.0 PERNICIOUS ANEMIA: ICD-10-CM

## 2024-04-03 RX ORDER — CYANOCOBALAMIN 1000 UG/ML
INJECTION, SOLUTION INTRAMUSCULAR; SUBCUTANEOUS
Qty: 3 ML | Refills: 0 | Status: SHIPPED | OUTPATIENT
Start: 2024-04-03

## 2024-04-03 NOTE — TELEPHONE ENCOUNTER
Rx Refill Note  Requested Prescriptions     Signed Prescriptions Disp Refills    cyanocobalamin 1000 MCG/ML injection 3 mL 0     Sig: INJECT 1ML INTRAMUSCULARLY IN THE APPROPRIATE MUSCLE AS DIRECTED BY PRESCRIBER EVERY 28 DAYS     Authorizing Provider: RUFINA FLORES     Ordering User: MIRNA MORFIN      Last office visit with prescribing clinician: 2/8/2024     Next office visit with prescribing clinician: 5/9/2024     Mirna Morfin  04/03/24, 10:27 EDT

## 2024-05-09 ENCOUNTER — OFFICE VISIT (OUTPATIENT)
Dept: INTERNAL MEDICINE | Facility: CLINIC | Age: 64
End: 2024-05-09
Payer: COMMERCIAL

## 2024-05-09 ENCOUNTER — LAB (OUTPATIENT)
Dept: INTERNAL MEDICINE | Facility: CLINIC | Age: 64
End: 2024-05-09
Payer: COMMERCIAL

## 2024-05-09 VITALS
HEART RATE: 72 BPM | DIASTOLIC BLOOD PRESSURE: 80 MMHG | HEIGHT: 64 IN | WEIGHT: 158 LBS | BODY MASS INDEX: 26.98 KG/M2 | TEMPERATURE: 97.3 F | RESPIRATION RATE: 12 BRPM | SYSTOLIC BLOOD PRESSURE: 152 MMHG

## 2024-05-09 DIAGNOSIS — R53.83 OTHER FATIGUE: ICD-10-CM

## 2024-05-09 DIAGNOSIS — H18.459 SALZMANN NODULAR DEGENERATION: ICD-10-CM

## 2024-05-09 DIAGNOSIS — Z12.39 ENCOUNTER FOR SCREENING FOR MALIGNANT NEOPLASM OF BREAST, UNSPECIFIED SCREENING MODALITY: ICD-10-CM

## 2024-05-09 DIAGNOSIS — E55.9 VITAMIN D DEFICIENCY: Chronic | ICD-10-CM

## 2024-05-09 DIAGNOSIS — I10 BENIGN ESSENTIAL HYPERTENSION: Primary | Chronic | ICD-10-CM

## 2024-05-09 DIAGNOSIS — F43.21 PROLONGED DEPRESSIVE ADJUSTMENT REACTION: Chronic | ICD-10-CM

## 2024-05-09 DIAGNOSIS — D50.9 MICROCYTIC ANEMIA: ICD-10-CM

## 2024-05-09 DIAGNOSIS — E03.9 ACQUIRED HYPOTHYROIDISM: Chronic | ICD-10-CM

## 2024-05-09 DIAGNOSIS — F51.01 PRIMARY INSOMNIA: Chronic | ICD-10-CM

## 2024-05-09 DIAGNOSIS — I10 BENIGN ESSENTIAL HYPERTENSION: Chronic | ICD-10-CM

## 2024-05-09 LAB
CHOLEST SERPL-MCNC: 196 MG/DL (ref 0–200)
DEPRECATED RDW RBC AUTO: 36.3 FL (ref 37–54)
ERYTHROCYTE [DISTWIDTH] IN BLOOD BY AUTOMATED COUNT: 11.4 % (ref 12.3–15.4)
HCT VFR BLD AUTO: 33.1 % (ref 34–46.6)
HDLC SERPL-MCNC: 78 MG/DL (ref 40–60)
HGB BLD-MCNC: 11.1 G/DL (ref 12–15.9)
LDLC SERPL CALC-MCNC: 108 MG/DL (ref 0–100)
LDLC/HDLC SERPL: 1.38 {RATIO}
MCH RBC QN AUTO: 29.4 PG (ref 26.6–33)
MCHC RBC AUTO-ENTMCNC: 33.5 G/DL (ref 31.5–35.7)
MCV RBC AUTO: 87.8 FL (ref 79–97)
PLATELET # BLD AUTO: 333 10*3/MM3 (ref 140–450)
PMV BLD AUTO: 10.7 FL (ref 6–12)
RBC # BLD AUTO: 3.77 10*6/MM3 (ref 3.77–5.28)
TRIGL SERPL-MCNC: 52 MG/DL (ref 0–150)
TSH SERPL DL<=0.05 MIU/L-ACNC: 1.59 UIU/ML (ref 0.27–4.2)
VLDLC SERPL-MCNC: 10 MG/DL (ref 5–40)
WBC NRBC COR # BLD AUTO: 4.99 10*3/MM3 (ref 3.4–10.8)

## 2024-05-09 PROCEDURE — 83540 ASSAY OF IRON: CPT | Performed by: INTERNAL MEDICINE

## 2024-05-09 PROCEDURE — 82746 ASSAY OF FOLIC ACID SERUM: CPT | Performed by: INTERNAL MEDICINE

## 2024-05-09 PROCEDURE — 36415 COLL VENOUS BLD VENIPUNCTURE: CPT | Performed by: INTERNAL MEDICINE

## 2024-05-09 PROCEDURE — 80061 LIPID PANEL: CPT | Performed by: INTERNAL MEDICINE

## 2024-05-09 PROCEDURE — 99214 OFFICE O/P EST MOD 30 MIN: CPT | Performed by: INTERNAL MEDICINE

## 2024-05-09 PROCEDURE — 80050 GENERAL HEALTH PANEL: CPT | Performed by: INTERNAL MEDICINE

## 2024-05-09 PROCEDURE — 82306 VITAMIN D 25 HYDROXY: CPT | Performed by: INTERNAL MEDICINE

## 2024-05-09 PROCEDURE — 82607 VITAMIN B-12: CPT | Performed by: INTERNAL MEDICINE

## 2024-05-09 PROCEDURE — 84466 ASSAY OF TRANSFERRIN: CPT | Performed by: INTERNAL MEDICINE

## 2024-05-09 RX ORDER — ZOLPIDEM TARTRATE 10 MG/1
10 TABLET ORAL NIGHTLY PRN
Qty: 90 TABLET | Refills: 0 | Status: SHIPPED | OUTPATIENT
Start: 2024-05-09

## 2024-05-09 RX ORDER — PROGESTERONE 200 MG/1
1 CAPSULE ORAL DAILY
COMMUNITY
Start: 2024-02-13

## 2024-05-09 RX ORDER — VALSARTAN 320 MG/1
320 TABLET ORAL DAILY
Qty: 90 TABLET | Refills: 1 | Status: SHIPPED | OUTPATIENT
Start: 2024-05-09

## 2024-05-10 DIAGNOSIS — D50.9 MICROCYTIC ANEMIA: Primary | ICD-10-CM

## 2024-05-10 LAB
25(OH)D3 SERPL-MCNC: 32.1 NG/ML (ref 30–100)
ALBUMIN SERPL-MCNC: 4.6 G/DL (ref 3.5–5.2)
ALBUMIN/GLOB SERPL: 2.1 G/DL
ALP SERPL-CCNC: 84 U/L (ref 39–117)
ALT SERPL W P-5'-P-CCNC: 15 U/L (ref 1–33)
ANION GAP SERPL CALCULATED.3IONS-SCNC: 14 MMOL/L (ref 5–15)
AST SERPL-CCNC: 21 U/L (ref 1–32)
BILIRUB SERPL-MCNC: 0.5 MG/DL (ref 0–1.2)
BUN SERPL-MCNC: 11 MG/DL (ref 8–23)
BUN/CREAT SERPL: 11.3 (ref 7–25)
CALCIUM SPEC-SCNC: 9.3 MG/DL (ref 8.6–10.5)
CHLORIDE SERPL-SCNC: 99 MMOL/L (ref 98–107)
CO2 SERPL-SCNC: 23 MMOL/L (ref 22–29)
CREAT SERPL-MCNC: 0.97 MG/DL (ref 0.57–1)
EGFRCR SERPLBLD CKD-EPI 2021: 66.6 ML/MIN/1.73
FOLATE SERPL-MCNC: >20 NG/ML (ref 4.78–24.2)
GLOBULIN UR ELPH-MCNC: 2.2 GM/DL
GLUCOSE SERPL-MCNC: 101 MG/DL (ref 65–99)
IRON 24H UR-MRATE: 114 MCG/DL (ref 37–145)
IRON SATN MFR SERPL: 29 % (ref 20–50)
POTASSIUM SERPL-SCNC: 4.8 MMOL/L (ref 3.5–5.2)
PROT SERPL-MCNC: 6.8 G/DL (ref 6–8.5)
SODIUM SERPL-SCNC: 136 MMOL/L (ref 136–145)
TIBC SERPL-MCNC: 392 MCG/DL (ref 298–536)
TRANSFERRIN SERPL-MCNC: 263 MG/DL (ref 200–360)
VIT B12 BLD-MCNC: 756 PG/ML (ref 211–946)

## 2024-06-06 ENCOUNTER — HOSPITAL ENCOUNTER (OUTPATIENT)
Dept: MAMMOGRAPHY | Facility: HOSPITAL | Age: 64
Discharge: HOME | End: 2024-06-06
Admitting: OBSTETRICS & GYNECOLOGY
Payer: COMMERCIAL

## 2024-06-06 DIAGNOSIS — Z12.39 ENCOUNTER FOR SCREENING FOR MALIGNANT NEOPLASM OF BREAST, UNSPECIFIED SCREENING MODALITY: ICD-10-CM

## 2024-06-06 PROCEDURE — 77063 BREAST TOMOSYNTHESIS BI: CPT

## 2024-06-06 PROCEDURE — 77067 SCR MAMMO BI INCL CAD: CPT

## 2024-06-22 DIAGNOSIS — D51.0 PERNICIOUS ANEMIA: ICD-10-CM

## 2024-06-24 RX ORDER — CYANOCOBALAMIN 1000 UG/ML
INJECTION, SOLUTION INTRAMUSCULAR; SUBCUTANEOUS
Qty: 3 ML | Refills: 3 | Status: SHIPPED | OUTPATIENT
Start: 2024-06-24

## 2024-06-24 NOTE — TELEPHONE ENCOUNTER
Rx Refill Note  Requested Prescriptions     Pending Prescriptions Disp Refills    cyanocobalamin 1000 MCG/ML injection [Pharmacy Med Name: Cyanocobalamin 1000 MCG/ML Injection Solution] 3 mL 0     Sig: INJECT 1ML INTRAMUSCULARLY AS DIRECTED EVERY 28 DAYS      Last office visit with prescribing clinician: 5/9/2024   Last telemedicine visit with prescribing clinician: Visit date not found   Next office visit with prescribing clinician: 8/8/2024       Chacha Elizabeth MA  06/24/24, 09:17 EDT

## 2024-08-08 ENCOUNTER — TELEMEDICINE (OUTPATIENT)
Dept: INTERNAL MEDICINE | Facility: CLINIC | Age: 64
End: 2024-08-08
Payer: COMMERCIAL

## 2024-08-08 VITALS
HEART RATE: 71 BPM | BODY MASS INDEX: 27.83 KG/M2 | SYSTOLIC BLOOD PRESSURE: 128 MMHG | DIASTOLIC BLOOD PRESSURE: 73 MMHG | WEIGHT: 160 LBS

## 2024-08-08 DIAGNOSIS — E03.9 ACQUIRED HYPOTHYROIDISM: Chronic | ICD-10-CM

## 2024-08-08 DIAGNOSIS — I10 BENIGN ESSENTIAL HYPERTENSION: Primary | Chronic | ICD-10-CM

## 2024-08-08 DIAGNOSIS — F51.01 PRIMARY INSOMNIA: Chronic | ICD-10-CM

## 2024-08-08 DIAGNOSIS — F43.21 PROLONGED DEPRESSIVE ADJUSTMENT REACTION: Chronic | ICD-10-CM

## 2024-08-08 PROCEDURE — 99214 OFFICE O/P EST MOD 30 MIN: CPT | Performed by: INTERNAL MEDICINE

## 2024-08-08 RX ORDER — ZOLPIDEM TARTRATE 10 MG/1
10 TABLET ORAL NIGHTLY PRN
Qty: 90 TABLET | Refills: 0 | Status: SHIPPED | OUTPATIENT
Start: 2024-08-08

## 2024-08-08 NOTE — PROGRESS NOTES
Answers submitted by the patient for this visit:  Other (Submitted on 8/8/2024)  Please describe your symptoms.: Routine visit for medication renewal  Have you had these symptoms before?: Yes  How long have you been having these symptoms?: Greater than 2 weeks  Please list any medications you are currently taking for this condition.: Ambien  Please describe any probable cause for these symptoms. : Imsomia  Primary Reason for Visit (Submitted on 8/8/2024)  What is the primary reason for your visit?: Other  Subjective   Ardena Judith Edmondson is a 63 y.o. female.     History of Present Illness     Video Visit was done today because of COVID-19.  patient has consented to receive care via Video Visit. She is in , and I am in [place], [place]. We did have trouble w/ the video portion of visit  Here for f/u on:    Insomnia- she is on ambien nightly and does help some     Htn -  on valsartan 320. We had raised dose at last visit in 5/24. She does check BP and gets readings that are pretty good. 80-90 hr; BP usually 120-130/70s. She has been started on phentermine by a weightloss clinic. She is staying active    Hypothyroid-she is on levothyroxine 25 mcg.  Last TSH was  normal in 5/24    Depression- she is on lexapro and feels OK    She is taking Olli for a multivitamin; did not tolerate the Centrum    Allergies - she did stop singulair as she wasn't sure it was helping. Stopped a few months ago, and is taking the stahist    Was started on a steroid ointment and nystatin ointment for her lips    Current Outpatient Medications on File Prior to Visit   Medication Sig Dispense Refill    Chlorcyclizine-Pseudoephed (STAHIST AD) 25-60 MG tablet 1 qd- bid prn allergies 60 tablet 11    Cholecalciferol (VITAMIN D) 50 MCG (2000 UT) tablet Take 1 tablet by mouth Daily.      cyanocobalamin 1000 MCG/ML injection INJECT 1ML INTRAMUSCULARLY AS DIRECTED EVERY 28 DAYS 3 mL 3    escitalopram (LEXAPRO) 10 MG tablet Take 1 tablet by  "mouth Daily. 90 tablet 3    fluorouracil (EFUDEX) 5 % cream 1 Application Daily As Needed.      fluticasone (FLONASE) 50 MCG/ACT nasal spray 2 sprays into the nostril(s) as directed by provider Daily. 16 g 11    levothyroxine (Euthyrox) 25 MCG tablet Take 1 tablet by mouth Daily. 90 tablet 0    Progesterone (PROMETRIUM) 200 MG capsule Take 1 capsule by mouth Daily.      Syringe/Needle, Disp, (Luer Lock Safety Syringes) 25G X 5/8\" 3 ML misc 1 each Every 30 (Thirty) Days. 50 each 0    valsartan (DIOVAN) 320 MG tablet Take 1 tablet by mouth Daily. 90 tablet 1    [DISCONTINUED] montelukast (Singulair) 10 MG tablet Take 1 tablet by mouth Every Night. 30 tablet 5    [DISCONTINUED] zolpidem (AMBIEN) 10 MG tablet Take 1 tablet by mouth At Night As Needed for Sleep. 90 tablet 0     No current facility-administered medications on file prior to visit.       The following portions of the patient's history were reviewed and updated as appropriate: allergies, current medications, past family history, past medical history, past social history, past surgical history and problem list.    Review of Systems    Objective   /73   Pulse 71   Wt 72.6 kg (160 lb)   BMI 27.83 kg/m²   Physical Exam  Physical Exam   Constitutional:  oriented to person, place, and time.   Psychiatric:  normal mood and affect. behavior is normal. Judgment and thought content normal.     Assessment & Plan   Diagnoses and all orders for this visit:    1. Benign essential hypertension (Primary)- good control. Monitor on the phentermine. Try to exercise daily    2. Acquired hypothyroidism- we can recheck tsh at next visit    3. Prolonged depressive adjustment reaction- stable on lexapro    4. Primary insomnia- stable on ambine. Pt has already signed controlled substance contract. Fantasma done today and appropriate.  due   -     zolpidem (AMBIEN) 10 MG tablet; Take 1 tablet by mouth At Night As Needed for Sleep.  Dispense: 90 tablet; Refill: 0        Prev: " abner abarca in 4/24. She will check on DEXA coverage. Schedule physical

## 2024-08-14 DIAGNOSIS — E03.9 ACQUIRED HYPOTHYROIDISM: Chronic | ICD-10-CM

## 2024-08-15 RX ORDER — LEVOTHYROXINE SODIUM 0.03 MG/1
25 TABLET ORAL DAILY
Qty: 90 TABLET | Refills: 2 | Status: SHIPPED | OUTPATIENT
Start: 2024-08-15

## 2024-08-15 NOTE — TELEPHONE ENCOUNTER
Rx Refill Note  Requested Prescriptions     Pending Prescriptions Disp Refills    levothyroxine (SYNTHROID, LEVOTHROID) 25 MCG tablet [Pharmacy Med Name: Levothyroxine Sodium 25 MCG Oral Tablet] 90 tablet 0     Sig: Take 1 tablet by mouth once daily      Last office visit with prescribing clinician: 5/9/2024   Last telemedicine visit with prescribing clinician: 8/8/2024   Next office visit with prescribing clinician: Visit date not found       Mirna Morfin  08/15/24, 07:42 EDT

## 2024-11-01 DIAGNOSIS — I10 BENIGN ESSENTIAL HYPERTENSION: Chronic | ICD-10-CM

## 2024-11-01 RX ORDER — VALSARTAN 320 MG/1
320 TABLET ORAL DAILY
Qty: 90 TABLET | Refills: 0 | Status: SHIPPED | OUTPATIENT
Start: 2024-11-01

## 2024-11-01 NOTE — TELEPHONE ENCOUNTER
Rx Refill Note  Requested Prescriptions     Pending Prescriptions Disp Refills    valsartan (DIOVAN) 320 MG tablet [Pharmacy Med Name: Valsartan 320 MG Oral Tablet] 90 tablet 0     Sig: Take 1 tablet by mouth once daily      Last office visit with prescribing clinician: 5/9/2024   Last telemedicine visit with prescribing clinician: 8/8/2024   Next office visit with prescribing clinician: 11/6/2024       Chacha Elizabeth MA  11/01/24, 09:40 EDT

## 2024-11-06 ENCOUNTER — OFFICE VISIT (OUTPATIENT)
Dept: INTERNAL MEDICINE | Facility: CLINIC | Age: 64
End: 2024-11-06
Payer: COMMERCIAL

## 2024-11-06 ENCOUNTER — LAB (OUTPATIENT)
Dept: INTERNAL MEDICINE | Facility: CLINIC | Age: 64
End: 2024-11-06
Payer: COMMERCIAL

## 2024-11-06 VITALS
DIASTOLIC BLOOD PRESSURE: 84 MMHG | RESPIRATION RATE: 12 BRPM | HEIGHT: 64 IN | HEART RATE: 74 BPM | OXYGEN SATURATION: 98 % | WEIGHT: 161.2 LBS | BODY MASS INDEX: 27.52 KG/M2 | SYSTOLIC BLOOD PRESSURE: 146 MMHG | TEMPERATURE: 97.8 F

## 2024-11-06 DIAGNOSIS — F43.21 PROLONGED DEPRESSIVE ADJUSTMENT REACTION: Chronic | ICD-10-CM

## 2024-11-06 DIAGNOSIS — J31.0 NONALLERGIC RHINITIS: ICD-10-CM

## 2024-11-06 DIAGNOSIS — E03.9 ACQUIRED HYPOTHYROIDISM: Chronic | ICD-10-CM

## 2024-11-06 DIAGNOSIS — I10 BENIGN ESSENTIAL HYPERTENSION: ICD-10-CM

## 2024-11-06 DIAGNOSIS — F51.01 PRIMARY INSOMNIA: Primary | Chronic | ICD-10-CM

## 2024-11-06 DIAGNOSIS — I10 BENIGN ESSENTIAL HYPERTENSION: Chronic | ICD-10-CM

## 2024-11-06 LAB
BASOPHILS # BLD AUTO: 0.06 10*3/MM3 (ref 0–0.2)
BASOPHILS NFR BLD AUTO: 1 % (ref 0–1.5)
DEPRECATED RDW RBC AUTO: 36.5 FL (ref 37–54)
EOSINOPHIL # BLD AUTO: 0.07 10*3/MM3 (ref 0–0.4)
EOSINOPHIL NFR BLD AUTO: 1.2 % (ref 0.3–6.2)
ERYTHROCYTE [DISTWIDTH] IN BLOOD BY AUTOMATED COUNT: 11.6 % (ref 12.3–15.4)
HCT VFR BLD AUTO: 33.9 % (ref 34–46.6)
HGB BLD-MCNC: 11.5 G/DL (ref 12–15.9)
IMM GRANULOCYTES # BLD AUTO: 0.03 10*3/MM3 (ref 0–0.05)
IMM GRANULOCYTES NFR BLD AUTO: 0.5 % (ref 0–0.5)
LYMPHOCYTES # BLD AUTO: 1.32 10*3/MM3 (ref 0.7–3.1)
LYMPHOCYTES NFR BLD AUTO: 21.9 % (ref 19.6–45.3)
MCH RBC QN AUTO: 30.2 PG (ref 26.6–33)
MCHC RBC AUTO-ENTMCNC: 33.9 G/DL (ref 31.5–35.7)
MCV RBC AUTO: 89 FL (ref 79–97)
MONOCYTES # BLD AUTO: 0.64 10*3/MM3 (ref 0.1–0.9)
MONOCYTES NFR BLD AUTO: 10.6 % (ref 5–12)
NEUTROPHILS NFR BLD AUTO: 3.9 10*3/MM3 (ref 1.7–7)
NEUTROPHILS NFR BLD AUTO: 64.8 % (ref 42.7–76)
NRBC BLD AUTO-RTO: 0 /100 WBC (ref 0–0.2)
PLATELET # BLD AUTO: 312 10*3/MM3 (ref 140–450)
PMV BLD AUTO: 11.1 FL (ref 6–12)
RBC # BLD AUTO: 3.81 10*6/MM3 (ref 3.77–5.28)
WBC NRBC COR # BLD AUTO: 6.02 10*3/MM3 (ref 3.4–10.8)

## 2024-11-06 PROCEDURE — 80050 GENERAL HEALTH PANEL: CPT | Performed by: INTERNAL MEDICINE

## 2024-11-06 PROCEDURE — 99214 OFFICE O/P EST MOD 30 MIN: CPT | Performed by: INTERNAL MEDICINE

## 2024-11-06 PROCEDURE — 90471 IMMUNIZATION ADMIN: CPT | Performed by: INTERNAL MEDICINE

## 2024-11-06 PROCEDURE — 36415 COLL VENOUS BLD VENIPUNCTURE: CPT | Performed by: INTERNAL MEDICINE

## 2024-11-06 PROCEDURE — 90656 IIV3 VACC NO PRSV 0.5 ML IM: CPT | Performed by: INTERNAL MEDICINE

## 2024-11-06 RX ORDER — MONTELUKAST SODIUM 10 MG/1
1 TABLET ORAL NIGHTLY
COMMUNITY

## 2024-11-06 RX ORDER — ZOLPIDEM TARTRATE 10 MG/1
10 TABLET ORAL NIGHTLY PRN
Qty: 90 TABLET | Refills: 0 | Status: SHIPPED | OUTPATIENT
Start: 2024-11-06

## 2024-11-06 NOTE — PROGRESS NOTES
Answers submitted by the patient for this visit:  Other (Submitted on 11/4/2024)  Please describe your symptoms.: Follow up appt for labs and medication  Have you had these symptoms before?: Yes  How long have you been having these symptoms?: Greater than 2 weeks  Please list any medications you are currently taking for this condition.: Ambien for insomia  Please describe any probable cause for these symptoms. : Chronic insomia  Primary Reason for Visit (Submitted on 11/4/2024)  What is the primary reason for your visit?: Problem Not Listed  Chief Complaint  Hypertension (Routine check in with Dr. Diggs today )    Subjective          Po Edmondson presents to [facility]    History of Present Illness  The patient is here for follow-up on hypertension and insomnia.    Hypertension-she has not been monitoring her blood pressure at home recently but takes the valsartan every evening.  No missed doses I    Insomnia-Ambien does help some with her sleep.  She does need a refill    Depression-patient currently on Lexapro and feels like it is doing well.    Hypothyroid-patient on Synthroid 25    Allergies-  She has been experiencing severe allergies since 09/2024, characterized by a runny nose, eye swelling, and a sensation of fullness in one ear. She found relief with Singulair, which she resumed taking four days ago. She has tried various over-the-counter antihistamines, including Zyrtec, but found Claritin ineffective. She has also used Flonase. She has undergone allergy testing twice, but the results were inconclusive.    IMMUNIZATIONS  She received her influenza vaccine today.         Current Outpatient Medications:     Chlorcyclizine-Pseudoephed (STAHIST AD) 25-60 MG tablet, 1 qd- bid prn allergies, Disp: 60 tablet, Rfl: 11    Cholecalciferol (VITAMIN D) 50 MCG (2000 UT) tablet, Take 1 tablet by mouth Daily., Disp: , Rfl:     cyanocobalamin 1000 MCG/ML injection, INJECT 1ML  "INTRAMUSCULARLY AS DIRECTED EVERY 28 DAYS, Disp: 3 mL, Rfl: 3    escitalopram (LEXAPRO) 10 MG tablet, Take 1 tablet by mouth Daily., Disp: 90 tablet, Rfl: 3    fluorouracil (EFUDEX) 5 % cream, 1 Application Daily As Needed., Disp: , Rfl:     fluticasone (FLONASE) 50 MCG/ACT nasal spray, 2 sprays into the nostril(s) as directed by provider Daily., Disp: 16 g, Rfl: 11    levothyroxine (SYNTHROID, LEVOTHROID) 25 MCG tablet, Take 1 tablet by mouth once daily, Disp: 90 tablet, Rfl: 2    montelukast (SINGULAIR) 10 MG tablet, Take 1 tablet by mouth Every Night., Disp: , Rfl:     Progesterone (PROMETRIUM) 200 MG capsule, Take 1 capsule by mouth Daily., Disp: , Rfl:     Syringe/Needle, Disp, (Luer Lock Safety Syringes) 25G X 5/8\" 3 ML misc, 1 each Every 30 (Thirty) Days., Disp: 50 each, Rfl: 0    valsartan (DIOVAN) 320 MG tablet, Take 1 tablet by mouth once daily, Disp: 90 tablet, Rfl: 0    zolpidem (AMBIEN) 10 MG tablet, Take 1 tablet by mouth At Night As Needed for Sleep., Disp: 90 tablet, Rfl: 0   The following portions of the patient's history were reviewed and updated as appropriate: allergies, current medications, past family history, past medical history, past social history, past surgical history and problem list.     Objective   Vital Signs:   /84   Pulse 74   Temp 97.8 °F (36.6 °C) (Infrared)   Resp 12   Ht 161.5 cm (63.58\")   Wt 73.1 kg (161 lb 3.2 oz)   SpO2 98%   BMI 28.03 kg/m²       Physical exam  Constitutional: oriented to person, place, and time.  well-developed and well-nourished. No distress.   HENT:   Head: Normocephalic and atraumatic.   TM: Some fluid on the right  Eyes: Conjunctivae and EOM are normal.   Cardiovascular: Normal rate, regular rhythm and normal heart sounds.  Exam reveals no gallop and no friction rub.    No murmur heard.  Pulmonary/Chest: Effort normal and breath sounds normal. No respiratory distress.   no wheezes.   Neurological:  alert and oriented to person, place, and " time.   Skin: Skin is warm and dry. not diaphoretic.   Psychiatric:  normal mood and affect. behavior is normal. Judgment and thought content normal.    132/82    Physical Exam     Physical Exam         Results         Result Review :   The following data was reviewed by: Eleanor Diggs MD on 11/06/2024:  CMP          5/9/2024    11:40   CMP   Glucose 101    BUN 11    Creatinine 0.97    EGFR 66.6    Sodium 136    Potassium 4.8    Chloride 99    Calcium 9.3    Total Protein 6.8    Albumin 4.6    Globulin 2.2    Total Bilirubin 0.5    Alkaline Phosphatase 84    AST (SGOT) 21    ALT (SGPT) 15    Albumin/Globulin Ratio 2.1    BUN/Creatinine Ratio 11.3    Anion Gap 14.0      CBC          5/9/2024    11:40   CBC   WBC 4.99    RBC 3.77    Hemoglobin 11.1    Hematocrit 33.1    MCV 87.8    MCH 29.4    MCHC 33.5    RDW 11.4    Platelets 333      Lipid Panel          5/9/2024    11:40   Lipid Panel   Total Cholesterol 196    Triglycerides 52    HDL Cholesterol 78    VLDL Cholesterol 10    LDL Cholesterol  108    LDL/HDL Ratio 1.38      TSH          5/9/2024    11:40   TSH   TSH 1.590                  Assessment and Plan          Assessment & Plan  1. Hypertension.  Recheck blood pressure looks better.  She will start monitoring blood pressure at home.  Check labs today    2. Insomnia.  Ambien helps some.  Ambien refilled today.  She has signed controlled substance contract.  Fantasma appropriate.    3. Allergic Rhinitis.  Better since she restarted the Singulair.  She will continue for a few more months and may see if she is able to get off of it during the winter.   She can call if she needs a refill.    4. Depression.-Stable with Lexapro    5. Health Maintenance.  She received her flu shot during today's visit.             Follow Up   No follow-ups on file.  Patient was given instructions and counseling regarding her condition or for health maintenance advice. Please see specific information pulled into the AVS if  appropriate.     Patient or patient representative verbalized consent for the use of Ambient Listening during the visit with  Eleanor Diggs MD for chart documentation. 11/6/2024  16:53 EST

## 2024-11-07 LAB
ALBUMIN SERPL-MCNC: 4.4 G/DL (ref 3.5–5.2)
ALBUMIN/GLOB SERPL: 1.6 G/DL
ALP SERPL-CCNC: 78 U/L (ref 39–117)
ALT SERPL W P-5'-P-CCNC: 15 U/L (ref 1–33)
ANION GAP SERPL CALCULATED.3IONS-SCNC: 9.1 MMOL/L (ref 5–15)
AST SERPL-CCNC: 21 U/L (ref 1–32)
BILIRUB SERPL-MCNC: 0.4 MG/DL (ref 0–1.2)
BUN SERPL-MCNC: 15 MG/DL (ref 8–23)
BUN/CREAT SERPL: 13.4 (ref 7–25)
CALCIUM SPEC-SCNC: 9.4 MG/DL (ref 8.6–10.5)
CHLORIDE SERPL-SCNC: 98 MMOL/L (ref 98–107)
CO2 SERPL-SCNC: 24.9 MMOL/L (ref 22–29)
CREAT SERPL-MCNC: 1.12 MG/DL (ref 0.57–1)
EGFRCR SERPLBLD CKD-EPI 2021: 55.7 ML/MIN/1.73
GLOBULIN UR ELPH-MCNC: 2.8 GM/DL
GLUCOSE SERPL-MCNC: 90 MG/DL (ref 65–99)
POTASSIUM SERPL-SCNC: 4.8 MMOL/L (ref 3.5–5.2)
PROT SERPL-MCNC: 7.2 G/DL (ref 6–8.5)
SODIUM SERPL-SCNC: 132 MMOL/L (ref 136–145)
TSH SERPL DL<=0.05 MIU/L-ACNC: 1.32 UIU/ML (ref 0.27–4.2)

## 2024-11-11 ENCOUNTER — PATIENT MESSAGE (OUTPATIENT)
Dept: INTERNAL MEDICINE | Facility: CLINIC | Age: 64
End: 2024-11-11
Payer: COMMERCIAL

## 2024-11-11 DIAGNOSIS — I10 BENIGN ESSENTIAL HYPERTENSION: Primary | Chronic | ICD-10-CM

## 2024-11-11 DIAGNOSIS — E87.1 HYPONATREMIA: ICD-10-CM

## 2024-12-17 RX ORDER — ESCITALOPRAM OXALATE 10 MG/1
10 TABLET ORAL DAILY
Qty: 90 TABLET | Refills: 0 | Status: SHIPPED | OUTPATIENT
Start: 2024-12-17

## 2024-12-17 NOTE — TELEPHONE ENCOUNTER
Rx Refill Note  Requested Prescriptions     Pending Prescriptions Disp Refills    escitalopram (LEXAPRO) 10 MG tablet [Pharmacy Med Name: Escitalopram Oxalate 10 MG Oral Tablet] 90 tablet 0     Sig: Take 1 tablet by mouth once daily      Last office visit with prescribing clinician: 11/6/2024   Last telemedicine visit with prescribing clinician: 8/8/2024   Next office visit with prescribing clinician: 2/12/2025       Mirna Morfin  12/17/24, 13:28 EST

## 2025-01-09 NOTE — TELEPHONE ENCOUNTER
Is it ok to early refill this.   Refill request received for Cymbalta 40mg daily  Last office visit: 1/7/2025  Next office visit: 1/14/2025  Last refill: 7/16/2024  Refill request approved and sent to preferred pharmacy.

## 2025-01-27 ENCOUNTER — E-VISIT (OUTPATIENT)
Dept: FAMILY MEDICINE CLINIC | Facility: TELEHEALTH | Age: 65
End: 2025-01-27
Payer: COMMERCIAL

## 2025-01-27 PROCEDURE — FABRICHEALTHVISIT: Performed by: NURSE PRACTITIONER

## 2025-01-27 NOTE — E-VISIT TREATED
Date: 2025 13:21:31  Clinician: Courtney Kessler  Clinician NPI: [id number]  Patient: Po Edmondson  Patient : 1960  Patient Address: [address], [place] [address]  Patient Phone: [phone]  Visit Protocol: URI  Patient Summary:  Po is a 64 year old ( : 1960 ) female who initiated a visit for cold, sinus infection, or influenza.     Po states the symptoms started gradually 6 days ago.   Symptom start date: 2025   The symptoms consist of a   headache, ear pain, rhinitis, nasal congestion, a cough, facial pain or pressure, and malaise.   Symptom details     Nasal secretions: The color of the mucus is yellow, clear, and blood-tinged.    Cough: Po coughs a few times an hour and the cough is not more bothersome at night. Phlegm comes into the throat when coughing. Po believes the cough is caused by post-nasal drip. The color of the phlegm is yellow and white.     Facial pain or pressure: The facial pain or pressure feels worse when bending over or leaning forward.     Headache: The headache is mild (1-3 on a 10 point pain scale).      Po denies having vomiting, myalgias, anosmia and ageusia, nausea, chills, diarrhea, teeth pain, fever, sore throat, and wheezing. Po also denies having recent facial or sinus surgery in the past 60 days, having a sinus infection within the past   year, taking antibiotic medication in the past month, and double sickening (worsening symptoms after initial improvement). Po is not experiencing dyspnea.   Precipitating events  Po has not recently been exposed to someone with influenza. Po   has not been in close contact with any high risk individuals.   Po has not received the RSV vaccine.   Po has received the influenza vaccine more than 2 weeks ago.   Pertinent COVID-19 (Coronavirus) information  Since the symptoms started,   Po has not tested for COVID-19. Po was able to test during the visit. The  result is negative.   Po has not received a COVID-19 vaccine in the past year.   Pertinent medical history    Po reports having the following conditions:     Hypertension    A provider has told Po to avoid NSAIDs.   Po does not get yeast infections when an antibiotic is taken.   Po does not have diabetes. Po denies having immunosuppressive conditions (e.g., chemotherapy, HIV, organ transplant,   long-term use of steroids or other immunosuppressive medications, splenectomy). Po denies having congestive heart failure, heart disease, and severe COPD. Po does not have asthma.   Po does not smoke or use smokeless tobacco. Po does not   vape or use other e-cigarette products.   Additional information as reported by the patient (free text): Have tried zertec, clartin, benadryl   Weight: 159 lbs (72.12 kg)    MEDICATIONS: Stahist AD oral, levothyroxine oral, progesterone micronized oral, fluticasone propionate nasal, zolpidem oral, valsartan oral, fluorouracil topical, cholecalciferol (vitamin D3) oral, ALLERGIES: bupropion, trazodone  Clinician Response:  Dear Po,  Based on the information provided, you have acute bacterial sinusitis, also known as a sinus infection. Most cases of sinus infections are caused by viruses and symptoms start to improve on their own in 7-10 days. Both   viral and bacterial sinus infections usually resolve on its own. A bacterial infection may have developed if any of the following apply to you.      Symptoms of sinus infection lasting 10 days or more without showing any improvement    If you feel better after a viral upper respiratory infection such as, a cold but then suddenly feel worse with symptoms such as fever, headache, or increase in nasal discharge     Medication information  I am prescribing:       Fluticasone 50 mcg/actuation nasal spray. Inhale 2 sprays in each nostril 1 time per day; after 1 week, may adjust to 1 - 2 sprays in each  nostril 1 time per day. This medication takes several days to start working, so keep taking it even if it doesn't   help right away. There are no refills with this prescription.      Amoxicillin-pot clavulanate 875-125 mg oral tablet. Take 1 tablet by mouth every 12 hours for 10 days. There are no refills with this prescription.      Brompheniramine-pseudoeph-DM (Bromfed DM) 2-30-10 mg/5mL oral syrup. Take 10 milliliters by mouth every 4 hours as needed for cough. Do not take more than 60mL in 24 hours. There are no refills with this prescription.     Yeast infections can be a common side effect of antibiotics. The most common symptom of a yeast infection is itchiness in and around the vagina. Other signs and symptoms include burning, redness of the vulva (the outer part of the female genitals), or   a thick, white vaginal discharge that looks like cottage cheese and does not have a bad smell.  Tips for using a nasal spray:     When the medication is being sprayed in your nose, point the tip of the nasal spray towards your ear.    Do not blow your nose after using the spray.    Do not lean your head back after using the spray as it will go down your throat.    Wipe the tip of the nose piece after use with a dry, clean tissue.     Self care  Steps you can take to be as comfortable as possible:     Rest.    Drink plenty of fluids.    Take a warm shower to loosen congestion.    Use a cool-mist humidifier.    Take a spoonful of honey to reduce your cough.     When to seek care  Please be seen in a clinic or urgent care if any of the following occur:     New symptoms develop, or symptoms become worse    Symptoms do not start to improve after 3 days of treatment     Call 911 or go to the emergency room if any of the following occur:     If you feel that your throat is closing off    Suddenly develop a rash    Unable to swallow fluids or are drooling     It is possible to have an allergic reaction to an antibiotic even if  you have not had one in the past. If you notice a new rash, significant swelling, or difficulty breathing, stop taking this medication immediately and go to a clinic or urgent care.    For the latest updates on COVID-19 (Coronavirus), please visit the Centers for Disease Control and Prevention (CDC). Also, your state and local health department websites may provide additional guidance regarding testing and isolation recommendations for   your location.   Diagnosis: Acute bacterial sinusitis  Diagnosis ICD: J01.90    Follow up instructions:  ATTENTION: If you have been prescribed medications, your prescriptions will not be sent until you choose your pharmacy. To do so open the link within your notification, or go to [employer] and click eVisit in the menu to open your   treatment plan. From there, you can select your pharmacy at the bottom of your after visit summary. You can also go to [email or web address]   Prescriptions  Prescription: fluticasone 50 mcg/actuation nasal spray,suspension, inhale 2 sprays in each nostril 1 time per day; after 1 week, may adjust to 1 - 2 sprays in each nostril 1 time per day.  Sent To: [facility] - [id number] - [address] [place] [address]  Prescription: brompheniramine-pseudoeph-DM (Bromfed DM) 2-30-10 mg/5 mL oral syrup, take 10 milliliters by mouth every 4 hours as needed for cough  Sent To: [facility] - [id number] - [address] [address]  Prescription: amoxicillin-pot clavulanate 875-125 mg oral tablet, take 1 tablet by mouth every 12 hours for 10 days  Sent To: [place] [id number] - [id number] - [address] [place] [address]

## 2025-01-28 DIAGNOSIS — I10 BENIGN ESSENTIAL HYPERTENSION: Chronic | ICD-10-CM

## 2025-01-28 RX ORDER — VALSARTAN 320 MG/1
320 TABLET ORAL DAILY
Qty: 90 TABLET | Refills: 0 | Status: SHIPPED | OUTPATIENT
Start: 2025-01-28

## 2025-01-28 NOTE — TELEPHONE ENCOUNTER
Rx Refill Note  Requested Prescriptions     Pending Prescriptions Disp Refills    valsartan (DIOVAN) 320 MG tablet [Pharmacy Med Name: Valsartan 320 MG Oral Tablet] 30 tablet 0     Sig: Take 1 tablet by mouth once daily      Last office visit with prescribing clinician: 11/6/2024   Last telemedicine visit with prescribing clinician: 8/8/2024   Next office visit with prescribing clinician: 2/12/2025       Mirna Morfin  01/28/25, 11:19 EST

## 2025-02-07 ENCOUNTER — OFFICE VISIT (OUTPATIENT)
Dept: INTERNAL MEDICINE | Facility: CLINIC | Age: 65
End: 2025-02-07
Payer: COMMERCIAL

## 2025-02-07 VITALS
HEIGHT: 64 IN | DIASTOLIC BLOOD PRESSURE: 78 MMHG | TEMPERATURE: 97.8 F | SYSTOLIC BLOOD PRESSURE: 136 MMHG | WEIGHT: 163.6 LBS | BODY MASS INDEX: 27.93 KG/M2 | HEART RATE: 84 BPM | OXYGEN SATURATION: 99 %

## 2025-02-07 DIAGNOSIS — E87.1 HYPONATREMIA: ICD-10-CM

## 2025-02-07 DIAGNOSIS — F51.01 PRIMARY INSOMNIA: Chronic | ICD-10-CM

## 2025-02-07 DIAGNOSIS — I10 BENIGN ESSENTIAL HYPERTENSION: Primary | Chronic | ICD-10-CM

## 2025-02-07 PROCEDURE — 99214 OFFICE O/P EST MOD 30 MIN: CPT | Performed by: INTERNAL MEDICINE

## 2025-02-07 RX ORDER — ESTRADIOL 1.53 MG/1
SPRAY TRANSDERMAL
COMMUNITY
Start: 2024-11-04

## 2025-02-07 RX ORDER — ZOLPIDEM TARTRATE 10 MG/1
10 TABLET ORAL NIGHTLY PRN
Qty: 90 TABLET | Refills: 0 | Status: SHIPPED | OUTPATIENT
Start: 2025-02-07

## 2025-02-07 NOTE — PROGRESS NOTES
Answers submitted by the patient for this visit:  Problem not listed (Submitted on 2/3/2025)  Chief Complaint: Other medical problem  Reason for appointment: Routine lab work , b/p, medication for insomia  anorexia: No  joint pain: No  change in stool: No  joint swelling: No  swollen glands: No  vertigo: No  visual change: No  Onset: more than 5 years  Chronicity: chronic  Frequency: daily  Medications tried: Ambien for insomia  Chief Complaint  Hypertension and Insomnia    Subjective          Mirzaa Judith Edmondson presents to [facility]    History of Present Illness    Hypertension-patient is on valsartan.  Blood work done 3 months ago showed slightly low sodium at 132, and elevated creatinine of 1.12 with GFR of 55.  She is trying to drink more fluids; uses NSAIDs very rarely    Insomnia-patient is on Ambien 10 - she is sleeping decently w/ this    Hypothyroid-patient is on Synthroid 25.  Last TSH was 11/24 and was 1.32    Normocytic anemia-hematocrit has been in the 33 range for many years.  We did iron, B12, folate levels in 5/24 which which were all normal    URI - she was given antibiotic through an e-visit, and sympytoms are better, though still intermittent - she has some congestion  She did home test for COVID and was negative  No fever  She has had some stomach ache w/ the antibiotics but no diarrhea or nausea          Current Outpatient Medications:     Chlorcyclizine-Pseudoephed (STAHIST AD) 25-60 MG tablet, 1 qd- bid prn allergies, Disp: 60 tablet, Rfl: 11    Cholecalciferol (VITAMIN D) 50 MCG (2000 UT) tablet, Take 1 tablet by mouth Daily., Disp: , Rfl:     cyanocobalamin 1000 MCG/ML injection, INJECT 1ML INTRAMUSCULARLY AS DIRECTED EVERY 28 DAYS, Disp: 3 mL, Rfl: 3    escitalopram (LEXAPRO) 10 MG tablet, Take 1 tablet by mouth once daily, Disp: 90 tablet, Rfl: 0    Evamist 1.53 MG/SPRAY transdermal spray, APPLY 1 PUMP TO SKIN EVERY DAY, Disp: , Rfl:     fluorouracil  "(EFUDEX) 5 % cream, 1 Application Daily As Needed., Disp: , Rfl:     fluticasone (FLONASE) 50 MCG/ACT nasal spray, 2 sprays into the nostril(s) as directed by provider Daily., Disp: 16 g, Rfl: 11    levothyroxine (SYNTHROID, LEVOTHROID) 25 MCG tablet, Take 1 tablet by mouth once daily, Disp: 90 tablet, Rfl: 2    Progesterone (PROMETRIUM) 200 MG capsule, Take 1 capsule by mouth Daily., Disp: , Rfl:     Syringe/Needle, Disp, (Luer Lock Safety Syringes) 25G X 5/8\" 3 ML misc, 1 each Every 30 (Thirty) Days., Disp: 50 each, Rfl: 0    valsartan (DIOVAN) 320 MG tablet, Take 1 tablet by mouth once daily, Disp: 90 tablet, Rfl: 0    zolpidem (AMBIEN) 10 MG tablet, Take 1 tablet by mouth At Night As Needed for Sleep., Disp: 90 tablet, Rfl: 0   The following portions of the patient's history were reviewed and updated as appropriate: allergies, current medications, past family history, past medical history, past social history, past surgical history and problem list.     Objective   Vital Signs:   /78   Pulse 84   Temp 97.8 °F (36.6 °C)   Ht 161.5 cm (63.58\")   Wt 74.2 kg (163 lb 9.6 oz)   SpO2 99%   BMI 28.45 kg/m²       Physical exam  Constitutional: oriented to person, place, and time.  well-developed and well-nourished. No distress.   HENT:   Head: Normocephalic and atraumatic.   Eyes: Conjunctivae and EOM are normal.   Cardiovascular: Normal rate, regular rhythm and normal heart sounds.  Exam reveals no gallop and no friction rub.    No murmur heard.  Pulmonary/Chest: Effort normal and breath sounds normal. No respiratory distress.   no wheezes.   Neurological:  alert and oriented to person, place, and time.   Skin: Skin is warm and dry. not diaphoretic.   Psychiatric:  normal mood and affect. behavior is normal. Judgment and thought content normal.      Physical Exam     Results         Result Review :   The following data was reviewed by: Eleanor Diggs MD on 02/07/2025:  CMP          5/9/2024    11:40 " 11/6/2024    12:04   CMP   Glucose 101  90    BUN 11  15    Creatinine 0.97  1.12    EGFR 66.6  55.7    Sodium 136  132    Potassium 4.8  4.8    Chloride 99  98    Calcium 9.3  9.4    Total Protein 6.8  7.2    Albumin 4.6  4.4    Globulin 2.2  2.8    Total Bilirubin 0.5  0.4    Alkaline Phosphatase 84  78    AST (SGOT) 21  21    ALT (SGPT) 15  15    Albumin/Globulin Ratio 2.1  1.6    BUN/Creatinine Ratio 11.3  13.4    Anion Gap 14.0  9.1      CBC w/diff          5/9/2024    11:40 11/6/2024    12:04   CBC w/Diff   WBC 4.99  6.02    RBC 3.77  3.81    Hemoglobin 11.1  11.5    Hematocrit 33.1  33.9    MCV 87.8  89.0    MCH 29.4  30.2    MCHC 33.5  33.9    RDW 11.4  11.6    Platelets 333  312    Neutrophil Rel %  64.8    Immature Granulocyte Rel %  0.5    Lymphocyte Rel %  21.9    Monocyte Rel %  10.6    Eosinophil Rel %  1.2    Basophil Rel %  1.0      Lipid Panel          5/9/2024    11:40   Lipid Panel   Total Cholesterol 196    Triglycerides 52    HDL Cholesterol 78    VLDL Cholesterol 10    LDL Cholesterol  108    LDL/HDL Ratio 1.38                 TSH   Date Value Ref Range Status   11/06/2024 1.320 0.270 - 4.200 uIU/mL Final          Assessment and Plan      Diagnoses and all orders for this visit:    1. Benign essential hypertension (Primary)-good control    2. Primary insomnia-stable on Ambien.  Patient has signed controlled substance contract.  Fantasma is appropriate.  -     zolpidem (AMBIEN) 10 MG tablet; Take 1 tablet by mouth At Night As Needed for Sleep.  Dispense: 90 tablet; Refill: 0    3. Hyponatremia-we will recheck BMP.  Creatinine was also slightly elevated        Assessment & Plan           Follow Up   Return in about 3 months (around 5/7/2025) for Next scheduled follow up.  Patient was given instructions and counseling regarding her condition or for health maintenance advice. Please see specific information pulled into the AVS if appropriate.

## 2025-02-13 ENCOUNTER — LAB (OUTPATIENT)
Dept: LAB | Facility: HOSPITAL | Age: 65
End: 2025-02-13
Payer: COMMERCIAL

## 2025-02-13 DIAGNOSIS — E87.1 HYPONATREMIA: ICD-10-CM

## 2025-02-13 DIAGNOSIS — I10 BENIGN ESSENTIAL HYPERTENSION: Chronic | ICD-10-CM

## 2025-02-13 PROCEDURE — 80048 BASIC METABOLIC PNL TOTAL CA: CPT

## 2025-02-14 LAB
ANION GAP SERPL CALCULATED.3IONS-SCNC: 13 MMOL/L (ref 5–15)
BUN SERPL-MCNC: 11 MG/DL (ref 8–23)
BUN/CREAT SERPL: 11.8 (ref 7–25)
CALCIUM SPEC-SCNC: 9.7 MG/DL (ref 8.6–10.5)
CHLORIDE SERPL-SCNC: 99 MMOL/L (ref 98–107)
CO2 SERPL-SCNC: 24 MMOL/L (ref 22–29)
CREAT SERPL-MCNC: 0.93 MG/DL (ref 0.57–1)
EGFRCR SERPLBLD CKD-EPI 2021: 69.6 ML/MIN/1.73
GLUCOSE SERPL-MCNC: 96 MG/DL (ref 65–99)
POTASSIUM SERPL-SCNC: 4.5 MMOL/L (ref 3.5–5.2)
SODIUM SERPL-SCNC: 136 MMOL/L (ref 136–145)

## 2025-03-14 RX ORDER — ESCITALOPRAM OXALATE 10 MG/1
10 TABLET ORAL DAILY
Qty: 90 TABLET | Refills: 1 | Status: SHIPPED | OUTPATIENT
Start: 2025-03-14

## 2025-04-24 DIAGNOSIS — I10 BENIGN ESSENTIAL HYPERTENSION: Chronic | ICD-10-CM

## 2025-04-24 RX ORDER — VALSARTAN 320 MG/1
320 TABLET ORAL DAILY
Qty: 90 TABLET | Refills: 0 | Status: SHIPPED | OUTPATIENT
Start: 2025-04-24

## 2025-04-24 NOTE — TELEPHONE ENCOUNTER
Rx Refill Note  Requested Prescriptions     Pending Prescriptions Disp Refills    valsartan (DIOVAN) 320 MG tablet [Pharmacy Med Name: Valsartan 320 MG Oral Tablet] 90 tablet 0     Sig: Take 1 tablet by mouth once daily      Last office visit with prescribing clinician: 2/7/2025     Next office visit with prescribing clinician: 5/9/2025       Mirna Morfin  04/24/25, 09:43 EDT

## 2025-05-09 ENCOUNTER — OFFICE VISIT (OUTPATIENT)
Dept: INTERNAL MEDICINE | Facility: CLINIC | Age: 65
End: 2025-05-09
Payer: COMMERCIAL

## 2025-05-09 VITALS
OXYGEN SATURATION: 98 % | HEART RATE: 91 BPM | SYSTOLIC BLOOD PRESSURE: 112 MMHG | TEMPERATURE: 97.5 F | BODY MASS INDEX: 26.92 KG/M2 | DIASTOLIC BLOOD PRESSURE: 76 MMHG | WEIGHT: 154.8 LBS

## 2025-05-09 DIAGNOSIS — H60.311 ACUTE DIFFUSE OTITIS EXTERNA OF RIGHT EAR: ICD-10-CM

## 2025-05-09 DIAGNOSIS — F51.01 PRIMARY INSOMNIA: Primary | Chronic | ICD-10-CM

## 2025-05-09 DIAGNOSIS — E03.9 ACQUIRED HYPOTHYROIDISM: Chronic | ICD-10-CM

## 2025-05-09 DIAGNOSIS — I10 BENIGN ESSENTIAL HYPERTENSION: Chronic | ICD-10-CM

## 2025-05-09 PROCEDURE — 99214 OFFICE O/P EST MOD 30 MIN: CPT | Performed by: INTERNAL MEDICINE

## 2025-05-09 RX ORDER — ZOLPIDEM TARTRATE 12.5 MG/1
12.5 TABLET, FILM COATED, EXTENDED RELEASE ORAL NIGHTLY PRN
Qty: 30 TABLET | Refills: 2 | Status: SHIPPED | OUTPATIENT
Start: 2025-05-09

## 2025-05-09 RX ORDER — CIPROFLOXACIN AND DEXAMETHASONE 3; 1 MG/ML; MG/ML
4 SUSPENSION/ DROPS AURICULAR (OTIC) 2 TIMES DAILY
Qty: 7.5 ML | Refills: 0 | Status: SHIPPED | OUTPATIENT
Start: 2025-05-09

## 2025-05-09 NOTE — PROGRESS NOTES
Answers submitted by the patient for this visit:  Problem not listed (Submitted on 5/2/2025)  Chief Complaint: Other medical problem  Reason for appointment: Routine check up  Other symptom: Rt ear is bothering me  Onset: 1 to 6 months  Chronicity: new  Frequency: intermittently  Medications tried: Stadhist, flonase  Additional information: N/a  Chief Complaint  office visit    Subjective          Po Edmondson presents to [facility]      Hypertension-patient is on valsartan.  Her sodium was slightly low but was back to normal when rechecked in February.  She did fill a prescription for phentermine in 2/25    Insomnia-patient is on Ambien 10 - she feels like she wakes up a little too early w/ it. Was on ambien CR in '20 but found it made her drowsy the next day, but she is not working now, so feels this would not be as much of an issue.  She had tried trazodone in the past but had increase in sweating with it so did not like it    Depression/anxiety-she is currently doing Lexapro and has also started doing some online counseling with [employer] over the last 6 weeks which she has found to be helpful    Hypothyroid-patient is on Synthroid 25.  Last TSH was 11/24 and was 1.32     R ear feels full intermittently, sometimes itchy-on and off over the last several months.  Has not used any topicals recently    Current Outpatient Medications:     Chlorcyclizine-Pseudoephed (STAHIST AD) 25-60 MG tablet, 1 qd- bid prn allergies, Disp: 60 tablet, Rfl: 11    Cholecalciferol (VITAMIN D) 50 MCG (2000 UT) tablet, Take 1 tablet by mouth Daily., Disp: , Rfl:     cyanocobalamin 1000 MCG/ML injection, INJECT 1ML INTRAMUSCULARLY AS DIRECTED EVERY 28 DAYS, Disp: 3 mL, Rfl: 3    escitalopram (LEXAPRO) 10 MG tablet, Take 1 tablet by mouth once daily, Disp: 90 tablet, Rfl: 1    Evamist 1.53 MG/SPRAY transdermal spray, APPLY 1 PUMP TO SKIN EVERY DAY, Disp: , Rfl:     fluorouracil (EFUDEX) 5 % cream, 1  "Application Daily As Needed., Disp: , Rfl:     fluticasone (FLONASE) 50 MCG/ACT nasal spray, 2 sprays into the nostril(s) as directed by provider Daily., Disp: 16 g, Rfl: 11    levothyroxine (SYNTHROID, LEVOTHROID) 25 MCG tablet, Take 1 tablet by mouth once daily, Disp: 90 tablet, Rfl: 2    Progesterone (PROMETRIUM) 200 MG capsule, Take 1 capsule by mouth Daily., Disp: , Rfl:     Syringe/Needle, Disp, (Luer Lock Safety Syringes) 25G X 5/8\" 3 ML misc, 1 each Every 30 (Thirty) Days., Disp: 50 each, Rfl: 0    valsartan (DIOVAN) 320 MG tablet, Take 1 tablet by mouth once daily, Disp: 90 tablet, Rfl: 0    zolpidem (AMBIEN) 10 MG tablet, Take 1 tablet by mouth At Night As Needed for Sleep., Disp: 90 tablet, Rfl: 0   The following portions of the patient's history were reviewed and updated as appropriate: allergies, current medications, past family history, past medical history, past social history, past surgical history and problem list.     Objective   Vital Signs:   /76   Pulse 91   Temp 97.5 °F (36.4 °C)   Wt 70.2 kg (154 lb 12.8 oz)   SpO2 98%   BMI 26.92 kg/m²       Physical exam  Constitutional: oriented to person, place, and time.  well-developed and well-nourished. No distress.   HENT:   Head: Normocephalic and atraumatic.   Right external canal erythematous.  TM looks normal.  Left external canal  -minimal erythema  Eyes: Conjunctivae and EOM are normal.   Cardiovascular: Normal rate, regular rhythm and normal heart sounds.  Exam reveals no gallop and no friction rub.    No murmur heard.  Pulmonary/Chest: Effort normal and breath sounds normal. No respiratory distress.   no wheezes.   Neurological:  alert and oriented to person, place, and time.   Skin: Skin is warm and dry. not diaphoretic.   Psychiatric:  normal mood and affect. behavior is normal. Judgment and thought content normal.      Physical Exam     Result Review :                    Lab Results   Component Value Date    WBC 6.02 11/06/2024 " "   HGB 11.5 (L) 11/06/2024    HCT 33.9 (L) 11/06/2024    MCV 89.0 11/06/2024     11/06/2024     Lab Results   Component Value Date    GLUCOSE 96 02/13/2025    BUN 11 02/13/2025    CREATININE 0.93 02/13/2025     02/13/2025    K 4.5 02/13/2025    CL 99 02/13/2025    CALCIUM 9.7 02/13/2025    PROTEINTOT 7.2 11/06/2024    ALBUMIN 4.4 11/06/2024    ALT 15 11/06/2024    AST 21 11/06/2024    ALKPHOS 78 11/06/2024    BILITOT 0.4 11/06/2024    GLOB 2.8 11/06/2024    AGRATIO 1.6 11/06/2024    BCR 11.8 02/13/2025    ANIONGAP 13.0 02/13/2025    EGFR 69.6 02/13/2025     Lab Results   Component Value Date    CHOL 196 05/09/2024    TRIG 52 05/09/2024    HDL 78 (H) 05/09/2024     (H) 05/09/2024     No results found for: \"HGBA1C\"  Lab Results   Component Value Date    TSH 1.320 11/06/2024     No results found for: \"KVOP417\"         Assessment and Plan      Diagnoses and all orders for this visit:    1. Primary insomnia (Primary)-we will switch her over to Ambien CR again since she is waking up early with the plain Ambien.  She has signed controlled substance contract.  [place] appropriate.  We did discuss that overall Ambien is not recommended for sleep but she does prefer to stay on it   -     zolpidem CR (Ambien CR) 12.5 MG CR tablet; Take 1 tablet by mouth At Night As Needed for Sleep.  Dispense: 30 tablet; Refill: 2    2. Acquired hypothyroidism-we will plan to recheck blood work in 3 months    3. Benign essential hypertension-good control    4. Acute diffuse otitis externa of right ear-I will send in some Ciprodex  -     ciprofloxacin-dexAMETHasone (Ciprodex) 0.3-0.1 % otic suspension; Administer 4 drops to the right ear 2 (Two) Times a Day.  Dispense: 7.5 mL; Refill: 0          Follow Up   No follow-ups on file.  Patient was given instructions and counseling regarding her condition or for health maintenance advice. Please see specific information pulled into the AVS if appropriate.   "

## 2025-05-13 DIAGNOSIS — E03.9 ACQUIRED HYPOTHYROIDISM: Chronic | ICD-10-CM

## 2025-05-14 DIAGNOSIS — E03.9 ACQUIRED HYPOTHYROIDISM: Chronic | ICD-10-CM

## 2025-05-14 RX ORDER — LEVOTHYROXINE SODIUM 25 UG/1
25 TABLET ORAL DAILY
Qty: 90 TABLET | Refills: 0 | Status: SHIPPED | OUTPATIENT
Start: 2025-05-14

## 2025-05-14 NOTE — TELEPHONE ENCOUNTER
Rx Refill Note  Requested Prescriptions     Pending Prescriptions Disp Refills    levothyroxine (SYNTHROID, LEVOTHROID) 25 MCG tablet [Pharmacy Med Name: Levothyroxine Sodium 25 MCG Oral Tablet] 90 tablet 0     Sig: Take 1 tablet by mouth once daily      Last office visit with prescribing clinician: 5/9/2025     Next office visit with prescribing clinician: 8/7/2025       Mirna Morfin  05/14/25, 17:37 EDT

## 2025-05-16 RX ORDER — LEVOTHYROXINE SODIUM 25 UG/1
25 TABLET ORAL DAILY
Qty: 90 TABLET | Refills: 0 | OUTPATIENT
Start: 2025-05-16

## 2025-05-22 DIAGNOSIS — D51.0 PERNICIOUS ANEMIA: ICD-10-CM

## 2025-05-22 RX ORDER — CYANOCOBALAMIN 1000 UG/ML
INJECTION, SOLUTION INTRAMUSCULAR; SUBCUTANEOUS
Qty: 3 ML | Refills: 3 | Status: SHIPPED | OUTPATIENT
Start: 2025-05-22

## 2025-05-27 RX ORDER — ZOLPIDEM TARTRATE 10 MG/1
10 TABLET ORAL NIGHTLY PRN
Qty: 30 TABLET | Refills: 1 | Status: SHIPPED | OUTPATIENT
Start: 2025-05-27

## 2025-07-29 RX ORDER — ZOLPIDEM TARTRATE 10 MG/1
10 TABLET ORAL NIGHTLY PRN
Qty: 30 TABLET | Refills: 0 | Status: SHIPPED | OUTPATIENT
Start: 2025-07-29

## 2025-08-07 ENCOUNTER — OFFICE VISIT (OUTPATIENT)
Dept: INTERNAL MEDICINE | Facility: CLINIC | Age: 65
End: 2025-08-07
Payer: COMMERCIAL

## 2025-08-07 VITALS
SYSTOLIC BLOOD PRESSURE: 116 MMHG | TEMPERATURE: 97.1 F | HEIGHT: 64 IN | BODY MASS INDEX: 26.46 KG/M2 | OXYGEN SATURATION: 100 % | HEART RATE: 80 BPM | WEIGHT: 155 LBS | DIASTOLIC BLOOD PRESSURE: 68 MMHG

## 2025-08-07 DIAGNOSIS — F51.01 PRIMARY INSOMNIA: Chronic | ICD-10-CM

## 2025-08-07 DIAGNOSIS — E03.9 ACQUIRED HYPOTHYROIDISM: Chronic | ICD-10-CM

## 2025-08-07 DIAGNOSIS — Z13.820 SCREENING FOR OSTEOPOROSIS: ICD-10-CM

## 2025-08-07 DIAGNOSIS — E55.9 VITAMIN D DEFICIENCY: Chronic | ICD-10-CM

## 2025-08-07 DIAGNOSIS — I10 BENIGN ESSENTIAL HYPERTENSION: Primary | Chronic | ICD-10-CM

## 2025-08-07 DIAGNOSIS — D51.0 PERNICIOUS ANEMIA: ICD-10-CM

## 2025-08-07 DIAGNOSIS — Z12.31 ENCOUNTER FOR SCREENING MAMMOGRAM FOR MALIGNANT NEOPLASM OF BREAST: ICD-10-CM

## 2025-08-07 DIAGNOSIS — H60.311 ACUTE DIFFUSE OTITIS EXTERNA OF RIGHT EAR: ICD-10-CM

## 2025-08-07 RX ORDER — ESCITALOPRAM OXALATE 10 MG/1
10 TABLET ORAL DAILY
Qty: 90 TABLET | Refills: 1 | Status: SHIPPED | OUTPATIENT
Start: 2025-08-07

## 2025-08-07 RX ORDER — CIPROFLOXACIN AND DEXAMETHASONE 3; 1 MG/ML; MG/ML
4 SUSPENSION/ DROPS AURICULAR (OTIC) 2 TIMES DAILY
Qty: 7.5 ML | Refills: 0 | Status: SHIPPED | OUTPATIENT
Start: 2025-08-07

## 2025-08-07 RX ORDER — VALSARTAN 320 MG/1
320 TABLET ORAL DAILY
Qty: 90 TABLET | Refills: 1 | Status: SHIPPED | OUTPATIENT
Start: 2025-08-07

## 2025-08-07 RX ORDER — ZOLPIDEM TARTRATE 10 MG/1
10 TABLET ORAL NIGHTLY PRN
Qty: 90 TABLET | Refills: 0 | Status: SHIPPED | OUTPATIENT
Start: 2025-08-07

## 2025-08-13 ENCOUNTER — LAB (OUTPATIENT)
Dept: INTERNAL MEDICINE | Facility: CLINIC | Age: 65
End: 2025-08-13
Payer: COMMERCIAL

## 2025-08-13 DIAGNOSIS — I10 BENIGN ESSENTIAL HYPERTENSION: Chronic | ICD-10-CM

## 2025-08-13 DIAGNOSIS — E55.9 VITAMIN D DEFICIENCY: Chronic | ICD-10-CM

## 2025-08-13 DIAGNOSIS — D51.0 PERNICIOUS ANEMIA: ICD-10-CM

## 2025-08-13 DIAGNOSIS — E03.9 ACQUIRED HYPOTHYROIDISM: Chronic | ICD-10-CM

## 2025-08-13 LAB
25(OH)D3 SERPL-MCNC: 26.5 NG/ML (ref 30–100)
ALBUMIN SERPL-MCNC: 4.9 G/DL (ref 3.5–5.2)
ALBUMIN/GLOB SERPL: 2.2 G/DL
ALP SERPL-CCNC: 86 U/L (ref 39–117)
ALT SERPL W P-5'-P-CCNC: 11 U/L (ref 1–33)
ANION GAP SERPL CALCULATED.3IONS-SCNC: 14.3 MMOL/L (ref 5–15)
AST SERPL-CCNC: 24 U/L (ref 1–32)
BILIRUB SERPL-MCNC: 0.6 MG/DL (ref 0–1.2)
BUN SERPL-MCNC: 15 MG/DL (ref 8–23)
BUN/CREAT SERPL: 14.6 (ref 7–25)
CALCIUM SPEC-SCNC: 10.1 MG/DL (ref 8.6–10.5)
CHLORIDE SERPL-SCNC: 97 MMOL/L (ref 98–107)
CHOLEST SERPL-MCNC: 216 MG/DL (ref 0–200)
CO2 SERPL-SCNC: 22.7 MMOL/L (ref 22–29)
CREAT SERPL-MCNC: 1.03 MG/DL (ref 0.57–1)
DEPRECATED RDW RBC AUTO: 39.1 FL (ref 37–54)
EGFRCR SERPLBLD CKD-EPI 2021: 61.2 ML/MIN/1.73
ERYTHROCYTE [DISTWIDTH] IN BLOOD BY AUTOMATED COUNT: 11.8 % (ref 12.3–15.4)
GLOBULIN UR ELPH-MCNC: 2.2 GM/DL
GLUCOSE SERPL-MCNC: 86 MG/DL (ref 65–99)
HCT VFR BLD AUTO: 35.2 % (ref 34–46.6)
HDLC SERPL-MCNC: 81 MG/DL (ref 40–60)
HGB BLD-MCNC: 11.5 G/DL (ref 12–15.9)
LDLC SERPL CALC-MCNC: 125 MG/DL (ref 0–100)
LDLC/HDLC SERPL: 1.53 {RATIO}
MCH RBC QN AUTO: 30.2 PG (ref 26.6–33)
MCHC RBC AUTO-ENTMCNC: 32.7 G/DL (ref 31.5–35.7)
MCV RBC AUTO: 92.4 FL (ref 79–97)
PLATELET # BLD AUTO: 328 10*3/MM3 (ref 140–450)
PMV BLD AUTO: 11 FL (ref 6–12)
POTASSIUM SERPL-SCNC: 4 MMOL/L (ref 3.5–5.2)
PROT SERPL-MCNC: 7.1 G/DL (ref 6–8.5)
RBC # BLD AUTO: 3.81 10*6/MM3 (ref 3.77–5.28)
SODIUM SERPL-SCNC: 134 MMOL/L (ref 136–145)
TRIGL SERPL-MCNC: 57 MG/DL (ref 0–150)
TSH SERPL DL<=0.05 MIU/L-ACNC: 2.2 UIU/ML (ref 0.27–4.2)
VIT B12 BLD-MCNC: 820 PG/ML (ref 211–946)
VLDLC SERPL-MCNC: 10 MG/DL (ref 5–40)
WBC NRBC COR # BLD AUTO: 5.99 10*3/MM3 (ref 3.4–10.8)

## 2025-08-13 PROCEDURE — 80050 GENERAL HEALTH PANEL: CPT | Performed by: INTERNAL MEDICINE

## 2025-08-13 PROCEDURE — 82306 VITAMIN D 25 HYDROXY: CPT | Performed by: INTERNAL MEDICINE

## 2025-08-13 PROCEDURE — 82607 VITAMIN B-12: CPT | Performed by: INTERNAL MEDICINE

## 2025-08-13 PROCEDURE — 80061 LIPID PANEL: CPT | Performed by: INTERNAL MEDICINE

## 2025-08-15 DIAGNOSIS — E03.9 ACQUIRED HYPOTHYROIDISM: Chronic | ICD-10-CM

## 2025-08-17 RX ORDER — LEVOTHYROXINE SODIUM 25 UG/1
25 TABLET ORAL DAILY
Qty: 90 TABLET | Refills: 3 | Status: SHIPPED | OUTPATIENT
Start: 2025-08-17

## 2025-08-21 ENCOUNTER — HOSPITAL ENCOUNTER (OUTPATIENT)
Dept: MAMMOGRAPHY | Facility: HOSPITAL | Age: 65
Discharge: HOME | End: 2025-08-21
Admitting: INTERNAL MEDICINE
Payer: COMMERCIAL

## 2025-08-21 DIAGNOSIS — Z12.31 ENCOUNTER FOR SCREENING MAMMOGRAM FOR MALIGNANT NEOPLASM OF BREAST: ICD-10-CM

## 2025-08-21 PROCEDURE — 77067 SCR MAMMO BI INCL CAD: CPT

## 2025-08-21 PROCEDURE — 77063 BREAST TOMOSYNTHESIS BI: CPT

## 2025-08-26 ENCOUNTER — TRANSCRIBE ORDERS (OUTPATIENT)
Dept: ADMINISTRATIVE | Facility: HOSPITAL | Age: 65
End: 2025-08-26
Payer: COMMERCIAL

## 2025-08-26 DIAGNOSIS — Z12.31 ENCOUNTER FOR SCREENING MAMMOGRAM FOR MALIGNANT NEOPLASM OF BREAST: Primary | ICD-10-CM
